# Patient Record
Sex: FEMALE | Race: WHITE | NOT HISPANIC OR LATINO | Employment: OTHER | URBAN - METROPOLITAN AREA
[De-identification: names, ages, dates, MRNs, and addresses within clinical notes are randomized per-mention and may not be internally consistent; named-entity substitution may affect disease eponyms.]

---

## 2017-03-30 ENCOUNTER — HISTORICAL (OUTPATIENT)
Dept: LAB | Facility: HOSPITAL | Age: 62
End: 2017-03-30

## 2017-04-06 ENCOUNTER — HISTORICAL (OUTPATIENT)
Dept: ADMINISTRATIVE | Facility: HOSPITAL | Age: 62
End: 2017-04-06

## 2017-05-04 ENCOUNTER — HISTORICAL (OUTPATIENT)
Dept: ADMINISTRATIVE | Facility: HOSPITAL | Age: 62
End: 2017-05-04

## 2017-05-04 LAB
DEPRECATED CALCIDIOL+CALCIFEROL SERPL-MC: 30.1 NG/ML (ref 30–80)
TSH SERPL-ACNC: 1.57 MIU/ML (ref 0.36–3.74)

## 2017-06-09 ENCOUNTER — TELEPHONE (OUTPATIENT)
Dept: TRANSPLANT | Facility: CLINIC | Age: 62
End: 2017-06-09

## 2017-07-10 ENCOUNTER — TELEPHONE (OUTPATIENT)
Dept: TRANSPLANT | Facility: CLINIC | Age: 62
End: 2017-07-10

## 2017-07-10 NOTE — TELEPHONE ENCOUNTER
----- Message from Estela Matute sent at 7/10/2017  1:53 PM CDT -----  Regarding: Make note  Sp to pt to confirm that her insurance will cover her appt her and they will but only cover one(1) appt until 8/1/2017 and only at [facility]. I told the pt that the [facility] clinic has a waitlist of at least (3) three months and the [facility] clinic is already fully booked for this month. Told the pt I will gave her info to nurse for her to talk w/Dr. Clement. Will call the ref Md office and let them know.

## 2017-07-13 NOTE — TELEPHONE ENCOUNTER
MA called patient to schedule her INitial visit to see Dr. Odom. Patient stated that she need to be seen by 8/1 due to insurance only approving her to come before 8/1.     Dr. Odom is in [place] today 7/13 and the next available she have is not till 8/24 her 8/10 is already booked.     Advice patient to talk to her insurance again and see if they can approve her for the month of August. Patient stated that that is very hard to do.     She will just talk to Dr. Torrez and see what doctor Shan wants to do.

## 2017-07-20 ENCOUNTER — TELEPHONE (OUTPATIENT)
Dept: HEPATOLOGY | Facility: CLINIC | Age: 62
End: 2017-07-20

## 2017-07-20 NOTE — TELEPHONE ENCOUNTER
MA called patient to schedule her Initial visit, she accepted 8/24/17 mailed appt reminder to patient. PEREZ

## 2017-08-24 ENCOUNTER — INITIAL CONSULT (OUTPATIENT)
Dept: HEPATOLOGY | Facility: CLINIC | Age: 62
End: 2017-08-24
Payer: COMMERCIAL

## 2017-08-24 VITALS
SYSTOLIC BLOOD PRESSURE: 163 MMHG | BODY MASS INDEX: 26.08 KG/M2 | HEART RATE: 83 BPM | HEIGHT: 65 IN | DIASTOLIC BLOOD PRESSURE: 97 MMHG | WEIGHT: 156.5 LBS

## 2017-08-24 DIAGNOSIS — R74.8 ELEVATED ALKALINE PHOSPHATASE LEVEL: ICD-10-CM

## 2017-08-24 PROCEDURE — 99245 OFF/OP CONSLTJ NEW/EST HI 55: CPT | Mod: S$GLB,,, | Performed by: INTERNAL MEDICINE

## 2017-08-24 RX ORDER — PANTOPRAZOLE SODIUM 40 MG/1
40 TABLET, DELAYED RELEASE ORAL DAILY
COMMUNITY

## 2017-08-24 RX ORDER — AMOXICILLIN 500 MG
2 CAPSULE ORAL DAILY
COMMUNITY

## 2017-08-24 NOTE — PROGRESS NOTES
[facility] Consultation Note    Reason for Consult:  The encounter diagnosis was Elevated alkaline phosphatase level.    PCP: Primary Doctor No   [address] / [place] [address]    HPI:  This is a 62 y.o. female here for evaluation of: elevated alk phos    No LFTs were received from Dr. Torrez's office.  Only value available for LFTs is alk phos fractionation, which gives a total alk phos of 257 and liver 76%, bone 24%.      Patient reports her alk phos was 180 in January 2016, at which time she was started on pravastatin.  In July alk  Phos increased to 251, she stopped paravstatin.   Alk phos was 312 in April od 2017, and was 257 on may 1, 2017.  She denies any symptoms, including no itching, jaundice, abd pain, wt loss, or wt gain.  No fever, chills, constip, diarrhea.     Labs from Dr Torrez's office:  AMA 56.2, ASMA 29, LKMB normal, LAMIN normal. Ferritin 197.8, hepatitis acute panel neg, cerulo 37.7.      EGD was suggestive of Barretts, and positive for H pylori  EUS was normal.    U/s per patient at Dr. Ibanez was apparently normal.      Liver biopsy 5/25/17: chronic hepatitis, grade 1, etiology undetermined, with minimal portal fibrosis, stage 1.  Comment: histologic features of PBC are not see, few eosinophils in the inflam infiltrate suggesting drug-induced hepatitis.  Only 2 portal tracts were seen.          Elevated liver enzymes: Yes  Abnormal imaging: No  Cirrhosis: No  Hepatitis C: No  Hepatitis B: No  Fatty liver: No  Encephalopathy: No  Post-hospital discharge: No  Symptoms: none    Primary hepatic manifestations:  Fatigue:No  Edema:No  Ascites:No  Encephalopathy:No  Abdominal pain:No  GI bleeds: No  Pruritus:No  Weight Changes:No  Changes in Bowel habits: No  Muscle cramps:No    Risk factors for liver disease:  No jaundice  No transfusions  No IVDU  Did not snort cocaine or similar agents  Did not live with anyone with hepatitis B or C  Sexual partner not tested  No  "hepatotoxic medications  No exposure to industrial toxins  Alcohol:       ROS:  Constitutional: No fevers, chills, weight changes, fatigue  ENT: No allergies, nosebleeds,   CV: No chest pain  Pulm: No cough, shortness of breath  Ophtho: No vision changes  GI/Liver: see HPI  Derm: No rash, itching  Heme: No swollen glands, bruising  MSK: No joint pains, joint swelling  : No dysuria, hematuria, decrease in urine output  Endo: No hot or cold intolerance  Neuro: No confusion, disorientation, difficulty with sleep, memory, concentration, syncope, seizure  Psych: No anxiety, depression    Medical History:  has a past medical history of H pylori ulcer (05/23/2017) and Hyperlipidemia.    Surgical History:  has a past surgical history that includes Esophagogastroduodenoscopy (05/23/2017); Upper endoscopic ultrasound w/ FNA (05/23/2017); Upper gastrointestinal endoscopy; and Liver biopsy (05/25/2017).    Family History: family history includes Breast cancer in her mother; Colon cancer in her maternal aunt; Heart disease in her father and mother; Hypertension in her father and mother..     Social History:  reports that she has quit smoking. She has never used smokeless tobacco. She reports that she does not drink alcohol.    Review of patient's allergies indicates:  No Known Allergies    Current Outpatient Prescriptions   Medication Sig    fish oil-omega-3 fatty acids 300-1,000 mg capsule Take 2 g by mouth once daily.    pantoprazole (PROTONIX) 40 MG tablet Take 40 mg by mouth once daily.     No current facility-administered medications for this visit.        Objective Findings:    Vital Signs:  BP (!) 163/97   Pulse 83   Ht 5' 5" (1.651 m)   Wt 71 kg (156 lb 8 oz)   BMI 26.04 kg/m²   Body mass index is 26.04 kg/m².    Physical Exam:  General Appearance: Well appearing in no acute distress  Head:   Normocephalic, without obvious abnormality  Eyes:    No scleral icterus, EOMI  ENT: Neck supple, Lips, mucosa, and tongue " normal; teeth and gums normal  Lungs: CTA bilaterally in anterior and posterior fields, no wheezes, no crackles.  Heart:  Regular rate and rhythm, S1, S2 normal, no murmurs heard  Abdomen: Soft, non tender, non distended with positive bowel sounds in all four quadrants. No hepatosplenomegaly, ascites, or mass  Extremities: 2+ pulses, no clubbing, cyanosis or edema  Skin: No rash  Neurologic: CN II-XII intact, alert, oriented x 3. No asterixis      Labs:  No results found for: WBC, HGB, HCT, PLT, CHOL, TRIG, HDL, LDLDIRECT, INR, CREATININE, BUN, BILITOT, ALT, AST, ALKPHOS, NA, K, CL, CO2, TSH, PSA, GLUF, HGBA1C, MICROALBUR, AFP    Imaging:   U/s normal per pt.    Endoscopy:    As above.    Assessment:  1. Elevated alkaline phosphatase level    Could be due to drug, or PBC, however no PBC lesions I the liver bx.  Biopsy was inadequate to make a firm diagnosis, due to only two portal tracts seen.  Stage 1 fibrosis and mild inflammmation indicate disease may be too early for any specific dx, especially of pBC. 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  Only takes fish oil and protonix, of which, fish oil can increase enzymes (no transaminases available).  Therefore, suggest measure LFTs now, then stop fish oil, then re-measure lFTs in 2 months.  If alk phos remains elevated, may use ursodiol 250 mg TID and monitor LFTs every 3 months to see if improvement occurs.  May attempt stopping saundra after 6 months to see if alk phos remains normal.  If abnormal, would repeat liver biopsy to obtain more tissue.       -  Patient will not return to me because of insurance issues.  She will return to Dr. Torrez.       No Follow-up on file.      Order summary:  No orders of the defined types were placed in this encounter.      Thank you so much for allowing me to participate in the care of Mary Beth Odom MD

## 2017-08-24 NOTE — LETTER
August 24, 2017      Davie Torrez MD  [address]  [address]  [place] [address]           [facility]  [address]  [place] [address]  Phone: [phone]          Patient: Mary Beth Henson   MR Number: [id number]   YOB: 1955   Date of Visit: 8/24/2017       Dear Dr. Davie Torrez:    Thank you for referring Mary Beth Henson to me for evaluation. Attached you will find relevant portions of my assessment and plan of care.    If you have questions, please do not hesitate to call me. I look forward to following Mary Beth Henson along with you.    Sincerely,    Monica Odom MD    Enclosure  CC:  No Recipients    If you would like to receive this communication electronically, please contact [email or web address] or [phone] to request more information on [email or web address] Link access.    For providers and/or their staff who would like to refer a patient to [facility], please contact us through our one-stop-shop provider referral line, [facility], at [phone].    If you feel you have received this communication in error or would no longer like to receive these types of communications, please e-mail [email or web address]

## 2018-04-12 ENCOUNTER — HISTORICAL (OUTPATIENT)
Dept: ADMINISTRATIVE | Facility: HOSPITAL | Age: 63
End: 2018-04-12

## 2018-06-07 ENCOUNTER — HISTORICAL (OUTPATIENT)
Dept: ADMINISTRATIVE | Facility: HOSPITAL | Age: 63
End: 2018-06-07

## 2018-11-01 ENCOUNTER — HISTORICAL (OUTPATIENT)
Dept: ADMINISTRATIVE | Facility: HOSPITAL | Age: 63
End: 2018-11-01

## 2019-05-16 ENCOUNTER — HISTORICAL (OUTPATIENT)
Dept: ADMINISTRATIVE | Facility: HOSPITAL | Age: 64
End: 2019-05-16

## 2019-05-16 LAB
ALBUMIN SERPL-MCNC: 4.2 GM/DL (ref 3.4–5)
ALBUMIN/GLOB SERPL: 1 RATIO (ref 1.1–2)
ALP SERPL-CCNC: 213 UNIT/L (ref 46–116)
ALT SERPL-CCNC: 37 UNIT/L (ref 12–78)
AST SERPL-CCNC: 21 UNIT/L (ref 10–37)
BILIRUB SERPL-MCNC: 0.5 MG/DL (ref 0.2–1)
BILIRUBIN DIRECT+TOT PNL SERPL-MCNC: 0.14 MG/DL (ref 0–0.2)
BILIRUBIN DIRECT+TOT PNL SERPL-MCNC: 0.36 MG/DL
BUN SERPL-MCNC: 21 MG/DL (ref 7–18)
CALCIUM SERPL-MCNC: 9.7 MG/DL (ref 8.5–10.1)
CHLORIDE SERPL-SCNC: 105 MMOL/L (ref 98–107)
CHOLEST SERPL-MCNC: 319 MG/DL (ref 50–200)
CHOLEST/HDLC SERPL: 5 {RATIO} (ref 0–5)
CO2 SERPL-SCNC: 30 MMOL/L (ref 21–32)
CREAT SERPL-MCNC: 0.93 MG/DL (ref 0.55–1.02)
GLOBULIN SER-MCNC: 4.3 GM/DL (ref 2.4–3.5)
GLUCOSE SERPL-MCNC: 96 MG/DL (ref 74–106)
HDLC SERPL-MCNC: 66 MG/DL (ref 35–60)
LDLC SERPL CALC-MCNC: 230 MG/DL (ref 50–140)
POTASSIUM SERPL-SCNC: 5.1 MMOL/L (ref 3.5–5.1)
PROT SERPL-MCNC: 8.5 GM/DL (ref 6.4–8.2)
SODIUM SERPL-SCNC: 141 MMOL/L (ref 136–145)
TRIGL SERPL-MCNC: 117 MG/DL (ref 30–150)
TSH SERPL-ACNC: 1.48 MIU/ML (ref 0.35–3.75)
VLDLC SERPL CALC-MCNC: 23 MG/DL

## 2019-05-30 ENCOUNTER — HISTORICAL (OUTPATIENT)
Dept: ADMINISTRATIVE | Facility: HOSPITAL | Age: 64
End: 2019-05-30

## 2019-06-27 ENCOUNTER — HISTORICAL (OUTPATIENT)
Dept: ADMINISTRATIVE | Facility: HOSPITAL | Age: 64
End: 2019-06-27

## 2019-06-27 LAB
ABS NEUT (OLG): 2.26
ALBUMIN SERPL-MCNC: 3.9 GM/DL (ref 3.4–5)
ALP SERPL-CCNC: 214 UNIT/L (ref 46–116)
ALT SERPL-CCNC: 39 UNIT/L (ref 12–78)
AST SERPL-CCNC: 24 UNIT/L (ref 10–37)
BASOPHILS # BLD AUTO: 0.02 X10(3)/MCL
BASOPHILS NFR BLD AUTO: 0.4 %
BILIRUB SERPL-MCNC: 0.5 MG/DL (ref 0.2–1)
BILIRUBIN DIRECT+TOT PNL SERPL-MCNC: 0.13 MG/DL (ref 0–0.2)
BILIRUBIN DIRECT+TOT PNL SERPL-MCNC: 0.37 MG/DL
CHOLEST SERPL-MCNC: 249 MG/DL (ref 50–200)
CHOLEST/HDLC SERPL: 4 {RATIO} (ref 0–5)
EOSINOPHIL # BLD AUTO: 0.06 X10(3)/MCL
EOSINOPHIL NFR BLD AUTO: 1.2 %
ERYTHROCYTE [DISTWIDTH] IN BLOOD BY AUTOMATED COUNT: 13 %
ERYTHROCYTE [SEDIMENTATION RATE] IN BLOOD: 24 MM/HR (ref 0–20)
HCT VFR BLD AUTO: 40.6 % (ref 34–46)
HDLC SERPL-MCNC: 65 MG/DL (ref 35–60)
HGB BLD-MCNC: 13.1 GM/DL (ref 11.3–15.4)
IMM GRANULOCYTES # BLD AUTO: 0.03 10*3/UL (ref 0–0.1)
IMM GRANULOCYTES NFR BLD AUTO: 0.6 % (ref 0–1)
LDLC SERPL CALC-MCNC: 164 MG/DL (ref 50–140)
LYMPHOCYTES # BLD AUTO: 2.17 X10(3)/MCL
LYMPHOCYTES NFR BLD AUTO: 43.2 %
MCH RBC QN AUTO: 28.9 PG (ref 27–33)
MCHC RBC AUTO-ENTMCNC: 32.3 GM/DL (ref 32–35)
MCV RBC AUTO: 89.6 FL (ref 81–97)
MONOCYTES # BLD AUTO: 0.48 X10(3)/MCL
MONOCYTES NFR BLD AUTO: 9.6 %
NEUTROPHILS # BLD AUTO: 2.26 X10(3)/MCL
NEUTROPHILS NFR BLD AUTO: 45 %
PLATELET # BLD AUTO: 235 X10(3)/MCL (ref 151–368)
PMV BLD AUTO: 11 FL
PROT SERPL-MCNC: 7.7 GM/DL (ref 6.4–8.2)
RBC # BLD AUTO: 4.53 X10(6)/MCL (ref 3.9–5)
TRIGL SERPL-MCNC: 102 MG/DL (ref 30–150)
VLDLC SERPL CALC-MCNC: 20 MG/DL
WBC # SPEC AUTO: 5.02 X10(3)/MCL (ref 3.4–9.2)

## 2019-12-05 ENCOUNTER — HISTORICAL (OUTPATIENT)
Dept: ADMINISTRATIVE | Facility: HOSPITAL | Age: 64
End: 2019-12-05

## 2019-12-05 LAB
ABS NEUT (OLG): 2.53
ALBUMIN SERPL-MCNC: 4.3 GM/DL (ref 3.2–4.6)
ALBUMIN/GLOB SERPL: 1.1 RATIO (ref 1.1–2)
ALP SERPL-CCNC: 174 UNIT/L (ref 40–150)
ALT SERPL-CCNC: 27 UNIT/L (ref 0–55)
AST SERPL-CCNC: 24 UNIT/L (ref 5–34)
BASOPHILS # BLD AUTO: 0.01 X10(3)/MCL
BASOPHILS NFR BLD AUTO: 0.2 %
BILIRUB SERPL-MCNC: 0.7 MG/DL
BILIRUBIN DIRECT+TOT PNL SERPL-MCNC: 0.3 MG/DL (ref 0–0.5)
BILIRUBIN DIRECT+TOT PNL SERPL-MCNC: 0.4 MG/DL
BUN SERPL-MCNC: 25 MG/DL (ref 9.8–20.1)
CALCIUM SERPL-MCNC: 10.5 MG/DL (ref 8.4–10.2)
CHLORIDE SERPL-SCNC: 104 MMOL/L (ref 98–107)
CHOLEST SERPL-MCNC: 271 MG/DL
CHOLEST/HDLC SERPL: 5 {RATIO} (ref 0–5)
CO2 SERPL-SCNC: 26 MEQ/L (ref 23–31)
CREAT SERPL-MCNC: 1.03 MG/DL (ref 0.55–1.02)
CRP SERPL-MCNC: 0.6 MG/DL
DEPRECATED CALCIDIOL+CALCIFEROL SERPL-MC: 33.26 NG/ML (ref 30–80)
EOSINOPHIL # BLD AUTO: 0.07 X10(3)/MCL
EOSINOPHIL NFR BLD AUTO: 1.4 %
ERYTHROCYTE [DISTWIDTH] IN BLOOD BY AUTOMATED COUNT: 14 %
ERYTHROCYTE [SEDIMENTATION RATE] IN BLOOD: 24 MM/HR (ref 0–20)
GLOBULIN SER-MCNC: 3.8 GM/DL (ref 2.4–3.5)
GLUCOSE SERPL-MCNC: 102 MG/DL (ref 82–115)
HCT VFR BLD AUTO: 41.6 % (ref 34–46)
HDLC SERPL-MCNC: 54 MG/DL
HGB BLD-MCNC: 13.6 GM/DL (ref 11.3–15.4)
IMM GRANULOCYTES # BLD AUTO: 0 10*3/UL (ref 0–0.1)
IMM GRANULOCYTES NFR BLD AUTO: 0 % (ref 0–1)
LDLC SERPL CALC-MCNC: 193 MG/DL (ref 50–140)
LYMPHOCYTES # BLD AUTO: 2.01 X10(3)/MCL
LYMPHOCYTES NFR BLD AUTO: 39.8 %
MCH RBC QN AUTO: 28.7 PG (ref 27–33)
MCHC RBC AUTO-ENTMCNC: 32.7 GM/DL (ref 32–35)
MCV RBC AUTO: 87.8 FL (ref 81–97)
MONOCYTES # BLD AUTO: 0.43 X10(3)/MCL
MONOCYTES NFR BLD AUTO: 8.5 %
NEUTROPHILS # BLD AUTO: 2.53 X10(3)/MCL
NEUTROPHILS NFR BLD AUTO: 50.1 %
PLATELET # BLD AUTO: 255 X10(3)/MCL (ref 140–450)
PMV BLD AUTO: 11 FL
POTASSIUM SERPL-SCNC: 5.1 MMOL/L (ref 3.5–5.1)
PROT SERPL-MCNC: 8.1 GM/DL (ref 5.8–7.6)
RBC # BLD AUTO: 4.74 X10(6)/MCL (ref 3.9–5)
SODIUM SERPL-SCNC: 140 MMOL/L (ref 136–145)
TRIGL SERPL-MCNC: 122 MG/DL (ref 37–140)
VLDLC SERPL CALC-MCNC: 24 MG/DL
WBC # SPEC AUTO: 5.05 X10(3)/MCL (ref 3.4–9.2)

## 2020-06-18 ENCOUNTER — HISTORICAL (OUTPATIENT)
Dept: ADMINISTRATIVE | Facility: HOSPITAL | Age: 65
End: 2020-06-18

## 2020-09-10 ENCOUNTER — HISTORICAL (OUTPATIENT)
Dept: ADMINISTRATIVE | Facility: HOSPITAL | Age: 65
End: 2020-09-10

## 2020-09-10 LAB
ALBUMIN SERPL-MCNC: 3.8 GM/DL (ref 3.4–4.8)
ALP SERPL-CCNC: 67 UNIT/L (ref 40–150)
ALT SERPL-CCNC: 36 UNIT/L (ref 0–55)
AST SERPL-CCNC: 33 UNIT/L (ref 5–34)
BILIRUB SERPL-MCNC: 0.4 MG/DL
BILIRUBIN DIRECT+TOT PNL SERPL-MCNC: 0.2 MG/DL
BILIRUBIN DIRECT+TOT PNL SERPL-MCNC: 0.2 MG/DL (ref 0–0.5)
CHOLEST SERPL-MCNC: 187 MG/DL
CHOLEST/HDLC SERPL: 3 {RATIO} (ref 0–5)
ERYTHROCYTE [SEDIMENTATION RATE] IN BLOOD: 23 MM/HR (ref 0–20)
HDLC SERPL-MCNC: 54 MG/DL (ref 35–60)
LDLC SERPL CALC-MCNC: 115 MG/DL (ref 50–140)
PROT SERPL-MCNC: 7 GM/DL (ref 5.8–7.6)
TRIGL SERPL-MCNC: 91 MG/DL (ref 37–140)
VLDLC SERPL CALC-MCNC: 18 MG/DL

## 2021-07-08 ENCOUNTER — HISTORICAL (OUTPATIENT)
Dept: ADMINISTRATIVE | Facility: HOSPITAL | Age: 66
End: 2021-07-08

## 2021-07-08 LAB
ABS NEUT (OLG): 1.94
ALBUMIN SERPL-MCNC: 4.3 GM/DL (ref 3.4–4.8)
ALBUMIN/GLOB SERPL: 1.2 RATIO (ref 1.1–2)
ALP SERPL-CCNC: 52 UNIT/L (ref 40–150)
ALT SERPL-CCNC: 29 UNIT/L (ref 0–55)
AST SERPL-CCNC: 31 UNIT/L (ref 5–34)
BASOPHILS # BLD AUTO: 0.01 X10(3)/MCL
BASOPHILS NFR BLD AUTO: 0.3 %
BILIRUB SERPL-MCNC: 0.5 MG/DL
BILIRUBIN DIRECT+TOT PNL SERPL-MCNC: 0.2 MG/DL (ref 0–0.5)
BILIRUBIN DIRECT+TOT PNL SERPL-MCNC: 0.3 MG/DL
BUN SERPL-MCNC: 25 MG/DL (ref 9.8–20.1)
CALCIUM SERPL-MCNC: 10.5 MG/DL (ref 8.4–10.2)
CHLORIDE SERPL-SCNC: 109 MMOL/L (ref 98–107)
CHOLEST SERPL-MCNC: 201 MG/DL
CHOLEST/HDLC SERPL: 4 {RATIO} (ref 0–5)
CO2 SERPL-SCNC: 29 MEQ/L (ref 23–31)
CREAT SERPL-MCNC: 1.01 MG/DL (ref 0.55–1.02)
CRP SERPL-MCNC: 0.21 MG/DL
DEPRECATED CALCIDIOL+CALCIFEROL SERPL-MC: 39.2 NG/ML (ref 30–80)
EOSINOPHIL # BLD AUTO: 0.08 X10(3)/MCL
EOSINOPHIL NFR BLD AUTO: 2.1 %
ERYTHROCYTE [DISTWIDTH] IN BLOOD BY AUTOMATED COUNT: 14 %
ERYTHROCYTE [SEDIMENTATION RATE] IN BLOOD: 17 MM/HR (ref 0–20)
GLOBULIN SER-MCNC: 3.5 GM/DL (ref 2.4–3.5)
GLUCOSE SERPL-MCNC: 88 MG/DL (ref 82–115)
HCT VFR BLD AUTO: 39.3 % (ref 34–46)
HDLC SERPL-MCNC: 54 MG/DL (ref 35–60)
HGB BLD-MCNC: 12.5 GM/DL (ref 11.3–15.4)
IMM GRANULOCYTES # BLD AUTO: 0.01 10*3/UL (ref 0–0.1)
IMM GRANULOCYTES NFR BLD AUTO: 0.3 % (ref 0–1)
LDLC SERPL CALC-MCNC: 129 MG/DL (ref 50–140)
LYMPHOCYTES # BLD AUTO: 1.49 X10(3)/MCL
LYMPHOCYTES NFR BLD AUTO: 38.7 %
MCH RBC QN AUTO: 28.5 PG (ref 27–33)
MCHC RBC AUTO-ENTMCNC: 31.8 GM/DL (ref 32–35)
MCV RBC AUTO: 89.5 FL (ref 81–97)
MONOCYTES # BLD AUTO: 0.32 X10(3)/MCL
MONOCYTES NFR BLD AUTO: 8.3 %
NEUTROPHILS # BLD AUTO: 1.94 X10(3)/MCL
NEUTROPHILS NFR BLD AUTO: 50.3 %
PLATELET # BLD AUTO: 277 X10(3)/MCL (ref 140–450)
PMV BLD AUTO: 11 FL
POTASSIUM SERPL-SCNC: 5 MMOL/L (ref 3.5–5.1)
PROT SERPL-MCNC: 7.8 GM/DL (ref 5.8–7.6)
RBC # BLD AUTO: 4.39 X10(6)/MCL (ref 3.9–5)
SODIUM SERPL-SCNC: 143 MMOL/L (ref 136–145)
TRIGL SERPL-MCNC: 88 MG/DL (ref 37–140)
TSH SERPL-ACNC: 1.94 UIU/ML (ref 0.35–4.94)
VLDLC SERPL CALC-MCNC: 18 MG/DL
WBC # SPEC AUTO: 3.85 X10(3)/MCL (ref 3.4–9.2)

## 2021-07-19 LAB — CRC RECOMMENDATION EXT: NORMAL

## 2022-01-14 ENCOUNTER — HISTORICAL (OUTPATIENT)
Dept: LAB | Facility: HOSPITAL | Age: 67
End: 2022-01-14

## 2022-01-14 LAB
ABS NEUT (OLG): 2.47
ALBUMIN SERPL-MCNC: 4.2 GM/DL (ref 3.4–4.8)
ALBUMIN/GLOB SERPL: 1.3 RATIO (ref 1.1–2)
ALP SERPL-CCNC: 49 UNIT/L (ref 40–150)
ALT SERPL-CCNC: 31 UNIT/L (ref 0–55)
AST SERPL-CCNC: 31 UNIT/L (ref 5–34)
BASOPHILS # BLD AUTO: 0.02 X10(3)/MCL
BASOPHILS NFR BLD AUTO: 0.5 %
BILIRUB SERPL-MCNC: 0.4 MG/DL
BILIRUBIN DIRECT+TOT PNL SERPL-MCNC: 0.2 MG/DL
BILIRUBIN DIRECT+TOT PNL SERPL-MCNC: 0.2 MG/DL (ref 0–0.5)
BUN SERPL-MCNC: 26 MG/DL (ref 9.8–20.1)
CALCIUM SERPL-MCNC: 10.2 MG/DL (ref 8.7–10.5)
CHLORIDE SERPL-SCNC: 109 MMOL/L (ref 98–107)
CHOLEST SERPL-MCNC: 189 MG/DL
CHOLEST/HDLC SERPL: 3 {RATIO} (ref 0–5)
CO2 SERPL-SCNC: 27 MEQ/L (ref 23–31)
CREAT SERPL-MCNC: 0.96 MG/DL (ref 0.55–1.02)
CRP SERPL-MCNC: 0.31 MG/DL
DEPRECATED CALCIDIOL+CALCIFEROL SERPL-MC: 32.3 NG/ML (ref 30–80)
EOSINOPHIL # BLD AUTO: 0.05 X10(3)/MCL
EOSINOPHIL NFR BLD AUTO: 1.1 %
ERYTHROCYTE [DISTWIDTH] IN BLOOD BY AUTOMATED COUNT: 13 %
ERYTHROCYTE [SEDIMENTATION RATE] IN BLOOD: 16 MM/HR (ref 0–20)
GLOBULIN SER-MCNC: 3.2 GM/DL (ref 2.4–3.5)
GLUCOSE SERPL-MCNC: 97 MG/DL (ref 82–115)
HCT VFR BLD AUTO: 37.2 % (ref 34–46)
HDLC SERPL-MCNC: 63 MG/DL (ref 35–60)
HGB BLD-MCNC: 12.1 GM/DL (ref 11.3–15.4)
IMM GRANULOCYTES # BLD AUTO: 0.01 10*3/UL (ref 0–0.1)
IMM GRANULOCYTES NFR BLD AUTO: 0.2 % (ref 0–1)
LDLC SERPL CALC-MCNC: 112 MG/DL (ref 50–140)
LYMPHOCYTES # BLD AUTO: 1.55 X10(3)/MCL
LYMPHOCYTES NFR BLD AUTO: 34.9 %
MAGNESIUM SERPL-MCNC: 2 MG/DL (ref 1.6–2.6)
MCH RBC QN AUTO: 29.2 PG (ref 27–33)
MCHC RBC AUTO-ENTMCNC: 32.5 GM/DL (ref 32–35)
MCV RBC AUTO: 89.6 FL (ref 81–97)
MONOCYTES # BLD AUTO: 0.34 X10(3)/MCL
MONOCYTES NFR BLD AUTO: 7.7 %
NEUTROPHILS # BLD AUTO: 2.47 X10(3)/MCL
NEUTROPHILS NFR BLD AUTO: 55.6 %
PLATELET # BLD AUTO: 287 X10(3)/MCL (ref 140–450)
PMV BLD AUTO: 12 FL
POTASSIUM SERPL-SCNC: 5 MMOL/L (ref 3.5–5.1)
PROT SERPL-MCNC: 7.4 GM/DL (ref 5.8–7.6)
RBC # BLD AUTO: 4.15 X10(6)/MCL (ref 3.9–5)
SODIUM SERPL-SCNC: 144 MMOL/L (ref 136–145)
TRIGL SERPL-MCNC: 70 MG/DL (ref 37–140)
VIT B12 SERPL-MCNC: 341 PG/ML (ref 213–816)
VLDLC SERPL CALC-MCNC: 14 MG/DL
WBC # SPEC AUTO: 4.44 X10(3)/MCL (ref 3.4–9.2)

## 2022-01-27 ENCOUNTER — HISTORICAL (OUTPATIENT)
Dept: RADIOLOGY | Facility: HOSPITAL | Age: 67
End: 2022-01-27

## 2022-06-01 ENCOUNTER — LAB VISIT (OUTPATIENT)
Dept: LAB | Facility: HOSPITAL | Age: 67
End: 2022-06-01
Attending: FAMILY MEDICINE
Payer: MEDICARE

## 2022-06-01 DIAGNOSIS — I10 HYPERTENSION, UNSPECIFIED TYPE: Primary | ICD-10-CM

## 2022-06-01 LAB
ALBUMIN SERPL-MCNC: 4.6 GM/DL (ref 3.4–4.8)
ALBUMIN/GLOB SERPL: 1.2 RATIO (ref 1.1–2)
ALP SERPL-CCNC: 59 UNIT/L (ref 40–150)
ALT SERPL-CCNC: 31 UNIT/L (ref 0–55)
AST SERPL-CCNC: 26 UNIT/L (ref 5–34)
BASOPHILS # BLD AUTO: 0.04 X10(3)/MCL (ref 0–0.2)
BASOPHILS NFR BLD AUTO: 0.7 %
BILIRUBIN DIRECT+TOT PNL SERPL-MCNC: 0.5 MG/DL
BUN SERPL-MCNC: 32 MG/DL (ref 9.8–20.1)
CALCIUM SERPL-MCNC: 10.7 MG/DL (ref 8.4–10.2)
CHLORIDE SERPL-SCNC: 106 MMOL/L (ref 98–107)
CHOLEST SERPL-MCNC: 230 MG/DL
CHOLEST/HDLC SERPL: 4 {RATIO} (ref 0–5)
CO2 SERPL-SCNC: 26 MMOL/L (ref 23–31)
CREAT SERPL-MCNC: 1.31 MG/DL (ref 0.55–1.02)
EOSINOPHIL # BLD AUTO: 0.06 X10(3)/MCL (ref 0–0.9)
EOSINOPHIL NFR BLD AUTO: 1 %
ERYTHROCYTE [DISTWIDTH] IN BLOOD BY AUTOMATED COUNT: 13.2 % (ref 11.5–17)
GLOBULIN SER-MCNC: 3.8 GM/DL (ref 2.4–3.5)
GLUCOSE SERPL-MCNC: 103 MG/DL (ref 82–115)
HCT VFR BLD AUTO: 42.8 % (ref 37–47)
HDLC SERPL-MCNC: 61 MG/DL (ref 35–60)
HGB BLD-MCNC: 14 GM/DL (ref 12–16)
IMM GRANULOCYTES # BLD AUTO: 0.02 X10(3)/MCL (ref 0–0.02)
IMM GRANULOCYTES NFR BLD AUTO: 0.3 % (ref 0–0.43)
LDLC SERPL CALC-MCNC: 152 MG/DL (ref 50–140)
LYMPHOCYTES # BLD AUTO: 1.82 X10(3)/MCL (ref 0.6–4.6)
LYMPHOCYTES NFR BLD AUTO: 31 %
MCH RBC QN AUTO: 28.9 PG (ref 27–31)
MCHC RBC AUTO-ENTMCNC: 32.7 MG/DL (ref 33–36)
MCV RBC AUTO: 88.4 FL (ref 80–94)
MONOCYTES # BLD AUTO: 0.53 X10(3)/MCL (ref 0.1–1.3)
MONOCYTES NFR BLD AUTO: 9 %
NEUTROPHILS # BLD AUTO: 3.4 X10(3)/MCL (ref 2.1–9.2)
NEUTROPHILS NFR BLD AUTO: 58 %
NRBC BLD AUTO-RTO: 0 %
PLATELET # BLD AUTO: 322 X10(3)/MCL (ref 130–400)
PMV BLD AUTO: 11.4 FL (ref 9.4–12.4)
POTASSIUM SERPL-SCNC: 5.9 MMOL/L (ref 3.5–5.1)
PROT SERPL-MCNC: 8.4 GM/DL (ref 5.8–7.6)
RBC # BLD AUTO: 4.84 X10(6)/MCL (ref 4.2–5.4)
SODIUM SERPL-SCNC: 140 MMOL/L (ref 136–145)
TRIGL SERPL-MCNC: 84 MG/DL (ref 37–140)
TSH SERPL-ACNC: 1.35 UIU/ML (ref 0.35–4.94)
VLDLC SERPL CALC-MCNC: 17 MG/DL
WBC # SPEC AUTO: 5.9 X10(3)/MCL (ref 4.5–11.5)

## 2022-06-01 PROCEDURE — 84443 ASSAY THYROID STIM HORMONE: CPT

## 2022-06-01 PROCEDURE — 36415 COLL VENOUS BLD VENIPUNCTURE: CPT

## 2022-06-01 PROCEDURE — 85025 COMPLETE CBC W/AUTO DIFF WBC: CPT

## 2022-06-01 PROCEDURE — 80053 COMPREHEN METABOLIC PANEL: CPT

## 2022-06-01 PROCEDURE — 80061 LIPID PANEL: CPT

## 2022-06-06 ENCOUNTER — HOSPITAL ENCOUNTER (OUTPATIENT)
Dept: RADIOLOGY | Facility: HOSPITAL | Age: 67
Discharge: HOME | End: 2022-06-06
Attending: FAMILY MEDICINE
Payer: MEDICARE

## 2022-06-06 DIAGNOSIS — Z12.31 BREAST CANCER SCREENING BY MAMMOGRAM: ICD-10-CM

## 2022-06-06 PROCEDURE — 77067 MAMMO DIGITAL SCREENING BILAT WITH TOMO: ICD-10-PCS | Mod: 26,59,, | Performed by: RADIOLOGY

## 2022-06-06 PROCEDURE — 77067 SCR MAMMO BI INCL CAD: CPT | Mod: 26,59,, | Performed by: RADIOLOGY

## 2022-06-06 PROCEDURE — 77067 SCR MAMMO BI INCL CAD: CPT | Mod: TC

## 2022-06-06 PROCEDURE — 77063 BREAST TOMOSYNTHESIS BI: CPT | Mod: 26,59,, | Performed by: RADIOLOGY

## 2022-06-06 PROCEDURE — 77063 MAMMO DIGITAL SCREENING BILAT WITH TOMO: ICD-10-PCS | Mod: 26,59,, | Performed by: RADIOLOGY

## 2022-06-15 ENCOUNTER — LAB VISIT (OUTPATIENT)
Dept: LAB | Facility: HOSPITAL | Age: 67
End: 2022-06-15
Attending: FAMILY MEDICINE
Payer: MEDICARE

## 2022-06-15 DIAGNOSIS — E87.5 HYPERKALEMIA: Primary | ICD-10-CM

## 2022-06-15 LAB — POTASSIUM SERPL-SCNC: 5.2 MMOL/L (ref 3.5–5.1)

## 2022-06-15 PROCEDURE — 36415 COLL VENOUS BLD VENIPUNCTURE: CPT

## 2022-06-15 PROCEDURE — 84132 ASSAY OF SERUM POTASSIUM: CPT

## 2022-09-28 ENCOUNTER — HOSPITAL ENCOUNTER (OUTPATIENT)
Dept: RADIOLOGY | Facility: HOSPITAL | Age: 67
Discharge: HOME | End: 2022-09-28
Attending: NURSE PRACTITIONER
Payer: MEDICARE

## 2022-09-28 DIAGNOSIS — K21.9 GASTROESOPHAGEAL REFLUX DISEASE: ICD-10-CM

## 2022-09-28 DIAGNOSIS — K74.2 HEPATIC FIBROSIS WITH HEPATIC SCLEROSIS: ICD-10-CM

## 2022-09-28 DIAGNOSIS — K22.70 BARRETT'S ESOPHAGUS WITHOUT DYSPLASIA: ICD-10-CM

## 2022-09-28 DIAGNOSIS — K74.3 PRIMARY BILIARY CHOLANGITIS: ICD-10-CM

## 2022-09-28 DIAGNOSIS — K57.30 DIVERTICULOSIS OF LARGE INTESTINE WITHOUT PERFORATION OR ABSCESS WITHOUT BLEEDING: ICD-10-CM

## 2022-09-28 PROCEDURE — 76700 US EXAM ABDOM COMPLETE: CPT | Mod: TC

## 2022-11-17 ENCOUNTER — OFFICE VISIT (OUTPATIENT)
Dept: FAMILY MEDICINE | Facility: CLINIC | Age: 67
End: 2022-11-17
Payer: MEDICARE

## 2022-11-17 VITALS
TEMPERATURE: 99 F | RESPIRATION RATE: 20 BRPM | DIASTOLIC BLOOD PRESSURE: 86 MMHG | OXYGEN SATURATION: 99 % | HEART RATE: 74 BPM | SYSTOLIC BLOOD PRESSURE: 132 MMHG | BODY MASS INDEX: 26.22 KG/M2 | HEIGHT: 65 IN | WEIGHT: 157.38 LBS

## 2022-11-17 DIAGNOSIS — Z13.820 ENCOUNTER FOR SCREENING FOR OSTEOPOROSIS: ICD-10-CM

## 2022-11-17 DIAGNOSIS — Z00.00 WELLNESS EXAMINATION: ICD-10-CM

## 2022-11-17 DIAGNOSIS — E78.5 HYPERLIPIDEMIA, UNSPECIFIED HYPERLIPIDEMIA TYPE: ICD-10-CM

## 2022-11-17 DIAGNOSIS — Z76.89 ESTABLISHING CARE WITH NEW DOCTOR, ENCOUNTER FOR: Primary | ICD-10-CM

## 2022-11-17 DIAGNOSIS — M65.312 TRIGGER FINGER OF LEFT THUMB: ICD-10-CM

## 2022-11-17 DIAGNOSIS — Z78.0 ASYMPTOMATIC MENOPAUSAL STATE: ICD-10-CM

## 2022-11-17 DIAGNOSIS — R41.3 IMPAIRED MEMORY: ICD-10-CM

## 2022-11-17 PROCEDURE — 99205 PR OFFICE/OUTPT VISIT, NEW, LEVL V, 60-74 MIN: ICD-10-PCS | Mod: ,,, | Performed by: REGISTERED NURSE

## 2022-11-17 PROCEDURE — 99205 OFFICE O/P NEW HI 60 MIN: CPT | Mod: ,,, | Performed by: REGISTERED NURSE

## 2022-11-17 RX ORDER — CITALOPRAM 20 MG/1
20 TABLET, FILM COATED ORAL DAILY
COMMUNITY
Start: 2022-10-01 | End: 2023-04-03

## 2022-11-17 RX ORDER — OBETICHOLIC ACID 5 MG/1
1 TABLET, FILM COATED ORAL DAILY
COMMUNITY
Start: 2022-11-10

## 2022-11-17 RX ORDER — FENOFIBRATE 145 MG/1
145 TABLET, FILM COATED ORAL DAILY
COMMUNITY
Start: 2022-11-10

## 2022-11-17 RX ORDER — URSODIOL 300 MG/1
300 CAPSULE ORAL 2 TIMES DAILY
COMMUNITY
Start: 2022-11-10

## 2022-11-17 NOTE — PROGRESS NOTES
"Subjective:       Patient ID: Mary Beth Henson is a 67 y.o. female.    Chief Complaint: Establish Care    Patient is a 67-year-old female here today to establish care and for wellness exam.  Patient has past medical history of anxiety, hyperlipidemia, GERD, and PBC.  Patient complaining of left thumb pain > 6 months, patient states pain is only at night and "feels like my thumb is stuck", patient also complaining of impaired memory, patient states her father was diagnosed with dementia and she has noticed a slight decline in cognition.    Review of Systems   Constitutional:  Negative for chills, fatigue and fever.   HENT: Negative.     Eyes: Negative.    Respiratory: Negative.     Cardiovascular: Negative.    Gastrointestinal:  Positive for reflux.   Endocrine: Negative.    Genitourinary: Negative.    Musculoskeletal:  Positive for joint deformity.   Integumentary:  Negative.   Allergic/Immunologic: Negative.    Neurological:  Positive for memory loss. Negative for dizziness, speech difficulty, weakness and headaches.   Hematological: Negative.    Psychiatric/Behavioral:  Negative for suicidal ideas. The patient is nervous/anxious.        Objective:      Physical Exam  Constitutional:       Appearance: Normal appearance.   HENT:      Head: Normocephalic.      Right Ear: Tympanic membrane normal.      Left Ear: Tympanic membrane normal.      Nose: Nose normal.      Mouth/Throat:      Mouth: Mucous membranes are moist.   Eyes:      Extraocular Movements: Extraocular movements intact.      Conjunctiva/sclera: Conjunctivae normal.      Pupils: Pupils are equal, round, and reactive to light.   Cardiovascular:      Rate and Rhythm: Normal rate and regular rhythm.      Pulses: Normal pulses.      Heart sounds: Normal heart sounds.   Pulmonary:      Effort: Pulmonary effort is normal.      Breath sounds: Normal breath sounds.   Abdominal:      General: Abdomen is flat.      Palpations: Abdomen is soft.   Musculoskeletal:   "       General: Tenderness present.      Right hand: Bony tenderness present. Decreased strength of thumb/finger opposition. Normal sensation. There is no disruption of two-point discrimination. Normal capillary refill. Normal pulse.      Left hand: Bony tenderness present. Decreased strength of thumb/finger opposition. Normal sensation. There is no disruption of two-point discrimination. Normal capillary refill. Normal pulse.      Cervical back: Normal range of motion and neck supple.   Skin:     General: Skin is warm.      Capillary Refill: Capillary refill takes less than 2 seconds.   Neurological:      Mental Status: She is alert and oriented to person, place, and time.      Sensory: Sensation is intact.      Motor: Motor function is intact. No weakness or tremor.      Coordination: Coordination is intact.      Gait: Gait is intact.      Comments: Pt reports decline in memory, pt AAOx3, able to complete ADLS independently but states she has noticed her short term memory isnt the same.                  Assessment:       Problem List Items Addressed This Visit          Neuro    Impaired memory    Relevant Orders    Ambulatory referral/consult to Neurology       Cardiac/Vascular    Hyperlipidemia       Orthopedic    Trigger finger of left thumb       Other    Wellness examination     Other Visit Diagnoses       Establishing care with new doctor, encounter for    -  Primary    Encounter for screening for osteoporosis        Relevant Orders    DXA Bone Density Spine And Hip    Asymptomatic menopausal state        Relevant Orders    DXA Bone Density Spine And Hip        I spent a total of 30 minutes on the day of the visit.This includes face to face time and non-face to face time preparing to see the patient (eg, review of tests), obtaining and/or reviewing separately obtained history, documenting clinical information in the electronic or other health record, independently interpreting results and communicating results  to the patient/family/caregiver, or care coordinator.     Plan:   Wellness exam-healthy lifestyle discussed with patient, labs reviewed today.  Last mammogram was June of 2022, last colonoscopy was 2022, DEXA scan ordered today. Will call patient with results    Anxiety-well controlled on Celexa, patient denies SI/HI, instructed to report to ED if these present    Hyperlipidemia-low-cholesterol/low-fat diet discussed with patient, cholesterol controlled on current   medication regimen.    Trigger finger-patient instructed to use OTC NSAIDs such as Motrin or Aleve as directed,     Impaired memory-neurology referral sent today, patient instructed to report any decline in mental status immediately or report to the ED, patient verbalized understanding.    Return to clinic in 1 year for wellness/or as needed

## 2023-02-15 ENCOUNTER — TELEPHONE (OUTPATIENT)
Dept: NEUROLOGY | Facility: CLINIC | Age: 68
End: 2023-02-15
Payer: MEDICARE

## 2023-02-15 DIAGNOSIS — R41.3 IMPAIRED MEMORY: Primary | ICD-10-CM

## 2023-02-15 NOTE — TELEPHONE ENCOUNTER
Good morning,    The referral sent to [facility] has been reviewed by Deedee Burns NP. At this time, she believes a referral to Geriatrics would be more appropriate. Please address.    Thank you,    [facility]

## 2023-02-17 LAB — BMD RECOMMENDATION EXT: NORMAL

## 2023-02-20 PROBLEM — Z00.00 WELLNESS EXAMINATION: Status: RESOLVED | Noted: 2022-11-17 | Resolved: 2023-02-20

## 2023-03-01 ENCOUNTER — TELEPHONE (OUTPATIENT)
Dept: FAMILY MEDICINE | Facility: CLINIC | Age: 68
End: 2023-03-01
Payer: MEDICARE

## 2023-03-14 ENCOUNTER — DOCUMENTATION ONLY (OUTPATIENT)
Dept: FAMILY MEDICINE | Facility: CLINIC | Age: 68
End: 2023-03-14
Payer: MEDICARE

## 2023-03-20 ENCOUNTER — TELEPHONE (OUTPATIENT)
Dept: FAMILY MEDICINE | Facility: CLINIC | Age: 68
End: 2023-03-20
Payer: MEDICARE

## 2023-03-20 NOTE — TELEPHONE ENCOUNTER
----- Message from Nimo Veronica sent at 3/20/2023 11:59 AM CDT -----  Regarding: results  She called to see if her bone density test results were in if so can ya'll please call her with those results # [phone]  thanks

## 2023-03-31 DIAGNOSIS — F41.9 ANXIETY DISORDER, UNSPECIFIED: ICD-10-CM

## 2023-04-03 ENCOUNTER — TELEPHONE (OUTPATIENT)
Dept: FAMILY MEDICINE | Facility: CLINIC | Age: 68
End: 2023-04-03
Payer: MEDICARE

## 2023-04-03 RX ORDER — CITALOPRAM 20 MG/1
TABLET, FILM COATED ORAL
Qty: 90 TABLET | Refills: 3 | Status: SHIPPED | OUTPATIENT
Start: 2023-04-03 | End: 2023-11-20

## 2023-04-03 NOTE — TELEPHONE ENCOUNTER
----- Message from Nimo Veronica sent at 4/3/2023  9:28 AM CDT -----  Regarding: refill  citalopram (CELEXA) 20 MG tablet, Pt called saying she needs a refill sent to [facility] in [facility]

## 2023-04-11 ENCOUNTER — LAB VISIT (OUTPATIENT)
Dept: LAB | Facility: HOSPITAL | Age: 68
End: 2023-04-11
Attending: NURSE PRACTITIONER
Payer: MEDICARE

## 2023-04-11 DIAGNOSIS — K74.2 HEPATIC FIBROSIS WITH HEPATIC SCLEROSIS: ICD-10-CM

## 2023-04-11 DIAGNOSIS — E78.5 HYPERLIPIDEMIA, UNSPECIFIED HYPERLIPIDEMIA TYPE: ICD-10-CM

## 2023-04-11 DIAGNOSIS — K21.9 GASTROESOPHAGEAL REFLUX DISEASE, UNSPECIFIED WHETHER ESOPHAGITIS PRESENT: ICD-10-CM

## 2023-04-11 DIAGNOSIS — K57.30 DIVERTICULOSIS OF LARGE INTESTINE WITHOUT DIVERTICULITIS: ICD-10-CM

## 2023-04-11 DIAGNOSIS — K74.3 CHOLANGITIC CIRRHOSIS: ICD-10-CM

## 2023-04-11 DIAGNOSIS — K22.70 BARRETT'S ESOPHAGUS: Primary | ICD-10-CM

## 2023-04-11 LAB
ALBUMIN SERPL-MCNC: 4.3 G/DL (ref 3.4–4.8)
ALBUMIN/GLOB SERPL: 1.2 RATIO (ref 1.1–2)
ALP SERPL-CCNC: 72 UNIT/L (ref 40–150)
ALT SERPL-CCNC: 29 UNIT/L (ref 0–55)
AST SERPL-CCNC: 29 UNIT/L (ref 5–34)
BASOPHILS # BLD AUTO: 0.03 X10(3)/MCL (ref 0–0.2)
BASOPHILS NFR BLD AUTO: 0.7 %
BILIRUBIN DIRECT+TOT PNL SERPL-MCNC: 0.5 MG/DL
BUN SERPL-MCNC: 20 MG/DL (ref 9.8–20.1)
CALCIUM SERPL-MCNC: 10.4 MG/DL (ref 8.4–10.2)
CHLORIDE SERPL-SCNC: 108 MMOL/L (ref 98–107)
CHOLEST SERPL-MCNC: 232 MG/DL
CHOLEST/HDLC SERPL: 4 {RATIO} (ref 0–5)
CO2 SERPL-SCNC: 27 MMOL/L (ref 23–31)
CREAT SERPL-MCNC: 1.11 MG/DL (ref 0.55–1.02)
CRP SERPL-MCNC: 3.3 MG/L
DEPRECATED CALCIDIOL+CALCIFEROL SERPL-MC: 49.7 NG/ML (ref 30–80)
EOSINOPHIL # BLD AUTO: 0.09 X10(3)/MCL (ref 0–0.9)
EOSINOPHIL NFR BLD AUTO: 2.1 %
ERYTHROCYTE [DISTWIDTH] IN BLOOD BY AUTOMATED COUNT: 13.6 % (ref 11.5–17)
ERYTHROCYTE [SEDIMENTATION RATE] IN BLOOD: 14 MM/HR (ref 0–20)
FERRITIN SERPL-MCNC: 205.01 NG/ML (ref 4.63–204)
GFR SERPLBLD CREATININE-BSD FMLA CKD-EPI: 55 MLS/MIN/1.73/M2
GLOBULIN SER-MCNC: 3.7 GM/DL (ref 2.4–3.5)
GLUCOSE SERPL-MCNC: 100 MG/DL (ref 82–115)
HCT VFR BLD AUTO: 41.3 % (ref 37–47)
HDLC SERPL-MCNC: 61 MG/DL (ref 35–60)
HGB BLD-MCNC: 13.3 G/DL (ref 12–16)
IMM GRANULOCYTES # BLD AUTO: 0.01 X10(3)/MCL (ref 0–0.04)
IMM GRANULOCYTES NFR BLD AUTO: 0.2 %
INR BLD: 1 (ref 0–1.3)
LDLC SERPL CALC-MCNC: 150 MG/DL (ref 50–140)
LYMPHOCYTES # BLD AUTO: 1.59 X10(3)/MCL (ref 0.6–4.6)
LYMPHOCYTES NFR BLD AUTO: 37.5 %
MCH RBC QN AUTO: 28.6 PG (ref 27–31)
MCHC RBC AUTO-ENTMCNC: 32.2 G/DL (ref 33–36)
MCV RBC AUTO: 88.8 FL (ref 80–94)
MONOCYTES # BLD AUTO: 0.41 X10(3)/MCL (ref 0.1–1.3)
MONOCYTES NFR BLD AUTO: 9.7 %
NEUTROPHILS # BLD AUTO: 2.11 X10(3)/MCL (ref 2.1–9.2)
NEUTROPHILS NFR BLD AUTO: 49.8 %
NRBC BLD AUTO-RTO: 0 %
PLATELET # BLD AUTO: 290 X10(3)/MCL (ref 130–400)
PMV BLD AUTO: 11.8 FL (ref 7.4–10.4)
POTASSIUM SERPL-SCNC: 5 MMOL/L (ref 3.5–5.1)
PROT SERPL-MCNC: 8 GM/DL (ref 5.8–7.6)
PROTHROMBIN TIME: 9.9 SECONDS (ref 9.1–10.9)
RBC # BLD AUTO: 4.65 X10(6)/MCL (ref 4.2–5.4)
SODIUM SERPL-SCNC: 144 MMOL/L (ref 136–145)
TRIGL SERPL-MCNC: 104 MG/DL (ref 37–140)
VLDLC SERPL CALC-MCNC: 21 MG/DL
WBC # SPEC AUTO: 4.2 X10(3)/MCL (ref 4.5–11.5)

## 2023-04-11 PROCEDURE — 85651 RBC SED RATE NONAUTOMATED: CPT

## 2023-04-11 PROCEDURE — 82728 ASSAY OF FERRITIN: CPT

## 2023-04-11 PROCEDURE — 80061 LIPID PANEL: CPT

## 2023-04-11 PROCEDURE — 85025 COMPLETE CBC W/AUTO DIFF WBC: CPT

## 2023-04-11 PROCEDURE — 82306 VITAMIN D 25 HYDROXY: CPT

## 2023-04-11 PROCEDURE — 36415 COLL VENOUS BLD VENIPUNCTURE: CPT

## 2023-04-11 PROCEDURE — 80053 COMPREHEN METABOLIC PANEL: CPT

## 2023-04-11 PROCEDURE — 85610 PROTHROMBIN TIME: CPT

## 2023-04-11 PROCEDURE — 86140 C-REACTIVE PROTEIN: CPT

## 2023-04-24 ENCOUNTER — OFFICE VISIT (OUTPATIENT)
Dept: FAMILY MEDICINE | Facility: CLINIC | Age: 68
End: 2023-04-24
Payer: MEDICARE

## 2023-04-24 VITALS
OXYGEN SATURATION: 99 % | SYSTOLIC BLOOD PRESSURE: 132 MMHG | HEIGHT: 65 IN | RESPIRATION RATE: 20 BRPM | TEMPERATURE: 97 F | DIASTOLIC BLOOD PRESSURE: 68 MMHG | BODY MASS INDEX: 26.3 KG/M2 | WEIGHT: 157.88 LBS | HEART RATE: 72 BPM

## 2023-04-24 DIAGNOSIS — R41.3 IMPAIRED MEMORY: Primary | ICD-10-CM

## 2023-04-24 PROBLEM — K82.4 POLYP OF GALLBLADDER: Status: ACTIVE | Noted: 2017-05-23

## 2023-04-24 PROBLEM — K22.70 BARRETT'S ESOPHAGUS: Status: ACTIVE | Noted: 2023-04-24

## 2023-04-24 PROBLEM — K74.2: Status: ACTIVE | Noted: 2023-04-24

## 2023-04-24 PROBLEM — K21.9 GASTROESOPHAGEAL REFLUX DISEASE: Status: ACTIVE | Noted: 2023-04-24

## 2023-04-24 PROBLEM — K74.3 PRIMARY BILIARY CHOLANGITIS: Status: ACTIVE | Noted: 2023-04-24

## 2023-04-24 PROBLEM — K29.00 ACUTE GASTRITIS: Status: ACTIVE | Noted: 2017-05-23

## 2023-04-24 PROBLEM — I10 HYPERTENSION: Status: ACTIVE | Noted: 2023-04-24

## 2023-04-24 PROBLEM — Z80.0 FAMILY HISTORY OF MALIGNANT NEOPLASM OF GASTROINTESTINAL TRACT: Status: ACTIVE | Noted: 2023-04-24

## 2023-04-24 LAB
FOLATE SERPL-MCNC: 13.6 NG/ML (ref 7–31.4)
HIV 1+2 AB+HIV1 P24 AG SERPL QL IA: NONREACTIVE
T PALLIDUM AB SER QL: NONREACTIVE
TSH SERPL-ACNC: 2.29 UIU/ML (ref 0.35–4.94)
VIT B12 SERPL-MCNC: 401 PG/ML (ref 213–816)

## 2023-04-24 PROCEDURE — 82746 ASSAY OF FOLIC ACID SERUM: CPT | Performed by: FAMILY MEDICINE

## 2023-04-24 PROCEDURE — 36415 COLL VENOUS BLD VENIPUNCTURE: CPT | Performed by: FAMILY MEDICINE

## 2023-04-24 PROCEDURE — 87389 HIV-1 AG W/HIV-1&-2 AB AG IA: CPT | Performed by: FAMILY MEDICINE

## 2023-04-24 PROCEDURE — 82607 VITAMIN B-12: CPT | Performed by: FAMILY MEDICINE

## 2023-04-24 PROCEDURE — 86780 TREPONEMA PALLIDUM: CPT | Performed by: FAMILY MEDICINE

## 2023-04-24 PROCEDURE — 84443 ASSAY THYROID STIM HORMONE: CPT | Performed by: FAMILY MEDICINE

## 2023-04-24 PROCEDURE — 99215 OFFICE O/P EST HI 40 MIN: CPT | Mod: PBBFAC | Performed by: FAMILY MEDICINE

## 2023-04-24 RX ORDER — ASPIRIN 81 MG/1
81 TABLET ORAL DAILY
COMMUNITY

## 2023-04-24 NOTE — PROGRESS NOTES
[facility]  [facility] Geriatric Clinic Note    DOS: 2023      Subjective:  Chief Complaint:    Chief Complaint   Patient presents with    Establish Care     [facility] Assessment       History of Present Illness:  Mary Beth Henson is a 67 y.o. female with a PMH of Anxiety, HLD, GERD, PBC who presents to geriatric clinic today for: Establishing Care in Geriatric Assessment Clinic    Reports family history of memory loss in father and paternal aunt. Father without official diagnosis of dementia, signs of memory loss after wife passed in 70-80's. Symptoms include mean, anger, outburst. Required assistance with bills and cooking. No memory loss in mother when passed at age 76.     Personal hx of forgetfulness. Ex: forgetting about cooking multiple times causing food to burn. Placing items and unable to find later. Decreased short term memory. Symptoms for about 1 year. Retired 1 year ago, mild symptoms at time of retired. Takes care of 3 year old great-grandchild. Denies getting lost when driving. Reports restful sleep.     PMH:  HTN- previously on medication causing hypotension, discontinued   Anxiety- Dx 61 yo, Citalopram 20 mg daily, helps with 75% reduction in anxiety     SocialHx:   Cigarettes: Start 23 yo, Quit 56 yo, 2 pack per day   Alcohol: Sober for 5 days, social drinker when consume    Past Medical History:   Diagnosis Date    Anxiety     GERD (gastroesophageal reflux disease)     H pylori ulcer 2017    Hyperlipidemia     Hypertension     Primary biliary cholangitis       Past Surgical History:   Procedure Laterality Date     SECTION      ESOPHAGOGASTRODUODENOSCOPY  2017    mucosa suggestive of Brasher's.     FISTULA REPAIR      LIVER BIOPSY  2017    UPPER ENDOSCOPIC ULTRASOUND W/ FNA  2017    normal    UPPER GASTROINTESTINAL ENDOSCOPY        Family History   Problem Relation Age of Onset    Breast cancer Mother     Hypertension Mother      Heart disease Mother     Heart disease Father     Hypertension Father     Colon cancer Maternal Aunt       Social History     Socioeconomic History    Marital status:     Number of children: 1   Occupational History    Occupation: retired   Tobacco Use    Smoking status: Former    Smokeless tobacco: Never   Substance and Sexual Activity    Alcohol use: No    Drug use: Never    Sexual activity: Not Currently        Review of patient's allergies indicates:  No Known Allergies       Current Outpatient Medications:     aspirin (ECOTRIN) 81 MG EC tablet, Take 81 mg by mouth once daily., Disp: , Rfl:     citalopram (CELEXA) 20 MG tablet, TAKE ONE TABLET BY MOUTH EVERY DAY, Disp: 90 tablet, Rfl: 3    fenofibrate (TRICOR) 145 MG tablet, Take 145 mg by mouth once daily., Disp: , Rfl:     fish oil-omega-3 fatty acids 300-1,000 mg capsule, Take 2 g by mouth once daily., Disp: , Rfl:     OCALIVA 5 mg Tab, Take 1 tablet by mouth once daily., Disp: , Rfl:     pantoprazole (PROTONIX) 40 MG tablet, Take 40 mg by mouth once daily., Disp: , Rfl:     ursodioL (ACTIGALL) 300 mg capsule, Take 300 mg by mouth 2 (two) times daily., Disp: , Rfl:      Review of Systems   HENT:  Negative for hearing loss.    Eyes:  Negative for discharge.   Respiratory:  Negative for wheezing.    Cardiovascular:  Negative for chest pain and palpitations.   Gastrointestinal:  Negative for blood in stool, constipation, diarrhea and vomiting.   Genitourinary:  Negative for dysuria and hematuria.   Musculoskeletal:  Negative for neck pain.   Neurological:  Negative for weakness and headaches.   Endo/Heme/Allergies:  Negative for polydipsia.   Psychiatric/Behavioral:            MD reviewed and updated patient provided answers      Objective:   Vitals:    04/24/23 1308 04/24/23 1314   BP: (!) 154/94 132/68   BP Location: Left arm Left arm   Patient Position: Sitting Sitting   BP Method: Medium (Automatic) Medium (Automatic)   Pulse: 68 72   Resp: 20   "  Temp: 96.8 °F (36 °C)    TempSrc: Oral    SpO2: 99%    Weight: 71.6 kg (157 lb 13.6 oz)    Height: 5' 5" (1.651 m)         Physical Exam  Constitutional:       Appearance: Normal appearance.   HENT:      Head: Normocephalic and atraumatic.      Mouth/Throat:      Mouth: Mucous membranes are moist.      Pharynx: Oropharynx is clear.   Eyes:      Conjunctiva/sclera: Conjunctivae normal.      Pupils: Pupils are equal, round, and reactive to light.   Cardiovascular:      Rate and Rhythm: Normal rate and regular rhythm.      Pulses: Normal pulses.   Pulmonary:      Effort: Pulmonary effort is normal.   Abdominal:      General: Abdomen is flat.   Musculoskeletal:         General: Normal range of motion.      Right lower leg: No edema.      Left lower leg: No edema.   Skin:     General: Skin is warm.      Capillary Refill: Capillary refill takes less than 2 seconds.   Neurological:      General: No focal deficit present.      Mental Status: She is alert.   Psychiatric:         Mood and Affect: Mood normal.         Behavior: Behavior normal.         Thought Content: Thought content normal.         Judgment: Judgment normal.        Geriatric Assessment:     Activities of Daily Living (ADLs):  Walking independent  Transferring independent  Feeding independent  Bathing independent  Toileting independent  Dressing/Grooming independent    Instrumental Activities of Daily Living (IADLs):  Finances independent  Transportation independent  Cooking independent  Cleaning/Laundry independent  Shopping independent  Telephone / Communication independent  Medications independent    Cognitive Assessment:  SLUMS performed date: 4/24/23 and scanned to patient's chart in media, Score 25/30    Depression Assessment:   Depression Patient Health Questionnaire 4/24/2023 4/24/2023 11/17/2022   Over the last two weeks how often have you been bothered by little interest or pleasure in doing things Not at all Not at all Not at all   Over the last " two weeks how often have you been bothered by feeling down, depressed or hopeless Not at all Not at all Not at all   PHQ-2 Total Score 0 0 0   Over the last two weeks how often have you been bothered by trouble falling or staying asleep, or sleeping too much Not at all - -   Over the last two weeks how often have you been bothered by feeling tired or having little energy Not at all - -   Over the last two weeks how often have you been bothered by a poor appetite or overeating Not at all - -   Over the last two weeks how often have you been bothered by feeling bad about yourself - or that you are a failure or have let yourself or your family down Not at all - -   Over the last two weeks how often have you been bothered by trouble concentrating on things, such as reading the newspaper or watching television More than half the days - -   Over the last two weeks how often have you been bothered by moving or speaking so slowly that other people could have noticed. Or the opposite - being so fidgety or restless that you have been moving around a lot more than usual. Nearly every day - -   Over the last two weeks how often have you been bothered by thoughts that you would be better off dead, or of hurting yourself Not at all - -   If you checked off any problems, how difficult have these problems made it for you to do your work, take care of things at home or get along with other people? Not difficult at all - -   Total Score 5 - -   Interpretation Mild - -     Assistive Devices:   none  Glasses: no  Hearing Aids: no  Dentures: no    Social support/Living Situation: Daughter, Two grandsons, One great grandson    Polypharmacy Identified? (>9 meds) no    Advanced Care Planning:  - [place] POST: not done yet, counseled patient and information provided    Recent labs:  CBC:  Lab Results   Component Value Date    WBC 4.2 (L) 04/11/2023    RBC 4.65 04/11/2023    HGB 13.3 04/11/2023    HCT 41.3 04/11/2023    MCV 88.8 04/11/2023    MCH  28.6 04/11/2023    MCHC 32.2 (L) 04/11/2023    RDW 13.6 04/11/2023     04/11/2023    MPV 11.8 (H) 04/11/2023      CMP:  Sodium Level   Date Value Ref Range Status   04/11/2023 144 136 - 145 mmol/L Final     Potassium Level   Date Value Ref Range Status   04/11/2023 5.0 3.5 - 5.1 mmol/L Final     Carbon Dioxide   Date Value Ref Range Status   04/11/2023 27 23 - 31 mmol/L Final     Blood Urea Nitrogen   Date Value Ref Range Status   04/11/2023 20.0 9.8 - 20.1 mg/dL Final     Creatinine   Date Value Ref Range Status   04/11/2023 1.11 (H) 0.55 - 1.02 mg/dL Final     Calcium Level Total   Date Value Ref Range Status   04/11/2023 10.4 (H) 8.4 - 10.2 mg/dL Final     Albumin Level   Date Value Ref Range Status   04/11/2023 4.3 3.4 - 4.8 g/dL Final     Bilirubin Total   Date Value Ref Range Status   04/11/2023 0.5 <=1.5 mg/dL Final     Alkaline Phosphatase   Date Value Ref Range Status   04/11/2023 72 40 - 150 unit/L Final     Aspartate Aminotransferase   Date Value Ref Range Status   04/11/2023 29 5 - 34 unit/L Final     Alanine Aminotransferase   Date Value Ref Range Status   04/11/2023 29 0 - 55 unit/L Final     Estimated GFR-Non    Date Value Ref Range Status   06/01/2022 43 mls/min/1.73/m2 Final      BMP:  Lab Results   Component Value Date     04/11/2023    K 5.0 04/11/2023    CO2 27 04/11/2023    BUN 20.0 04/11/2023    CREATININE 1.11 (H) 04/11/2023    CALCIUM 10.4 (H) 04/11/2023    EGFRNONAA 43 06/01/2022      Lipid Panel:  Lab Results   Component Value Date    CHOL 232 (H) 04/11/2023    CHOL 202 (H) 09/28/2022    CHOL 230 (H) 06/01/2022     Lab Results   Component Value Date    HDL 61 (H) 04/11/2023    HDL 52 09/28/2022    HDL 61 (H) 06/01/2022     No results found for: LDLCALC  Lab Results   Component Value Date    TRIG 104 04/11/2023    TRIG 75 09/28/2022    TRIG 84 06/01/2022     No results found for: CHOLHDL   HbA1c:  Lab Results   Component Value Date    HGBA1C 5.4 09/28/2022       TSH:  Lab Results   Component Value Date    TSH 1.3496 06/01/2022     Assessment & Plan:    1. Impaired memory      SLUMS indicative of Mild Cognitive impairment   Labs ordered today: Folate, B12, TSH, HIV, Syphilis  Other labs reviewed, ASCVD risk 6.1%  Given risk factors for vascular dementia, recommend continuing daily low dose aspirin  Discussion of low salt and cholesterol lowering diet   Recommend daily moderate daily activity at least 5 days per week  Discussion of memory promoting activities  Offered Donepezil, discussion of risk vs benefits. Patient education given. Call clinic if wishes to start the medication    Return to clinic in 6 month for lab results    Ashley Murguia MD  [facility] HO-3

## 2023-04-24 NOTE — PATIENT INSTRUCTIONS
Memory promoting activities:  Reading   Dominoes  Cards   Sohail  Chess  Checkers  Learning new skills

## 2023-05-01 ENCOUNTER — PATIENT MESSAGE (OUTPATIENT)
Dept: ADMINISTRATIVE | Facility: HOSPITAL | Age: 68
End: 2023-05-01
Payer: MEDICARE

## 2023-05-02 ENCOUNTER — PATIENT OUTREACH (OUTPATIENT)
Dept: ADMINISTRATIVE | Facility: HOSPITAL | Age: 68
End: 2023-05-02
Payer: MEDICARE

## 2023-05-02 NOTE — PROGRESS NOTES
Population Health. Out Reach. Reviewing patient's chart for quality metrics. Records request sent to Dr. Torrez for Colonoscopy.

## 2023-05-02 NOTE — LETTER
AUTHORIZATION FOR RELEASE OF   CONFIDENTIAL INFORMATION    Dear Dr. Torrez, [phone]    We are seeing Mary Beth Henson, date of birth 1955, in the clinic at [facility]. Keyshawn Florez DO is the patient's PCP. Mary Beth Henson has an outstanding lab/procedure at the time we reviewed her chart. In order to help keep her health information updated, she has authorized us to request the following medical record(s):        (  )  MAMMOGRAM                                      ( X )  COLONOSCOPY      (  )  PAP SMEAR                                          (  )  OUTSIDE LAB RESULTS     (  )  DEXA SCAN                                          (  )  EYE EXAM            (  )  FOOT EXAM                                          (  )  ENTIRE RECORD     (  )  OUTSIDE IMMUNIZATIONS                 (  )  _______________         Please fax records to [facility], Quinn Quebodeaux, DO, [phone]     If you have any questions you can contact Ambreen Caceres at [phone].       Patient Name: Mary Beth Henson  : 1955  Patient Phone #: [phone]

## 2023-05-31 ENCOUNTER — DOCUMENTATION ONLY (OUTPATIENT)
Dept: FAMILY MEDICINE | Facility: CLINIC | Age: 68
End: 2023-05-31
Payer: MEDICARE

## 2023-06-06 ENCOUNTER — HOSPITAL ENCOUNTER (OUTPATIENT)
Dept: CARDIOLOGY | Facility: HOSPITAL | Age: 68
Discharge: HOME | End: 2023-06-06
Attending: NURSE PRACTITIONER
Payer: MEDICARE

## 2023-06-06 ENCOUNTER — HOSPITAL ENCOUNTER (OUTPATIENT)
Dept: RADIOLOGY | Facility: HOSPITAL | Age: 68
Discharge: HOME | End: 2023-06-06
Attending: NURSE PRACTITIONER
Payer: MEDICARE

## 2023-06-06 DIAGNOSIS — R06.02 SOB (SHORTNESS OF BREATH): ICD-10-CM

## 2023-06-06 DIAGNOSIS — R06.09 DOE (DYSPNEA ON EXERTION): ICD-10-CM

## 2023-06-06 DIAGNOSIS — Z82.49 FAMILY HISTORY OF CORONARY ARTERY DISEASE: ICD-10-CM

## 2023-06-06 DIAGNOSIS — R00.2 PALPITATIONS: ICD-10-CM

## 2023-06-06 LAB
AORTIC ROOT ANNULUS: 3.46 CM
AORTIC VALVE CUSP SEPERATION: 1.02 CM
AV PEAK GRADIENT: 5 MMHG
CV ECHO LV RWT: 0.44 CM
DOP CALC AO PEAK VEL: 1.17 M/S
E WAVE DECELERATION TIME: 262.69 MSEC
E/A RATIO: 0.91
ECHO LV POSTERIOR WALL: 0.92 CM (ref 0.6–1.1)
EJECTION FRACTION: 57 %
FRACTIONAL SHORTENING: 30 % (ref 28–44)
INTERVENTRICULAR SEPTUM: 0.93 CM (ref 0.6–1.1)
LEFT ATRIUM SIZE: 0 CM
LEFT INTERNAL DIMENSION IN SYSTOLE: 2.9 CM (ref 2.1–4)
LEFT VENTRICLE DIASTOLIC VOLUME: 76.42 ML
LEFT VENTRICLE SYSTOLIC VOLUME: 32.3 ML
LEFT VENTRICULAR INTERNAL DIMENSION IN DIASTOLE: 4.15 CM (ref 3.5–6)
LEFT VENTRICULAR MASS: 120.85 G
MV PEAK A VEL: 0.93 M/S
MV PEAK E VEL: 0.85 M/S
MV STENOSIS PRESSURE HALF TIME: 76.18 MS
MV VALVE AREA P 1/2 METHOD: 2.89 CM2
PISA MRMAX VEL: 4.63 M/S
PISA TR MAX VEL: 2.57 M/S
PV PEAK VELOCITY: 0.83 CM/S
RA PRESSURE: 3 MMHG
RIGHT VENTRICULAR END-DIASTOLIC DIMENSION: 2.5 CM
TR MAX PG: 26 MMHG
TRICUSPID VALVE PEAK A WAVE VELOCITY: 0.49 M/S
TV PEAK E VEL: 0.6 M/S
TV REST PULMONARY ARTERY PRESSURE: 29 MMHG
TV STENOSIS PRESSURE HALF TIME: 47.67 MS
TV VALVE AREA P 1/2 METHOD: 3.99 CM2

## 2023-06-06 PROCEDURE — 93306 TTE W/DOPPLER COMPLETE: CPT

## 2023-06-06 PROCEDURE — 75571 CT HRT W/O DYE W/CA TEST: CPT | Mod: TC

## 2023-08-28 ENCOUNTER — LAB VISIT (OUTPATIENT)
Dept: LAB | Facility: HOSPITAL | Age: 68
End: 2023-08-28
Attending: NURSE PRACTITIONER
Payer: MEDICARE

## 2023-08-28 DIAGNOSIS — E78.2 MIXED HYPERLIPIDEMIA: Primary | ICD-10-CM

## 2023-08-28 LAB
ALBUMIN SERPL-MCNC: 4.2 G/DL (ref 3.4–4.8)
ALBUMIN/GLOB SERPL: 1.3 RATIO (ref 1.1–2)
ALP SERPL-CCNC: 55 UNIT/L (ref 40–150)
ALT SERPL-CCNC: 22 UNIT/L (ref 0–55)
AST SERPL-CCNC: 23 UNIT/L (ref 5–34)
BILIRUB SERPL-MCNC: 0.5 MG/DL
BUN SERPL-MCNC: 27 MG/DL (ref 9.8–20.1)
CALCIUM SERPL-MCNC: 10.2 MG/DL (ref 8.4–10.2)
CHLORIDE SERPL-SCNC: 110 MMOL/L (ref 98–107)
CHOLEST SERPL-MCNC: 145 MG/DL
CHOLEST/HDLC SERPL: 3 {RATIO} (ref 0–5)
CO2 SERPL-SCNC: 27 MMOL/L (ref 23–31)
CREAT SERPL-MCNC: 1.21 MG/DL (ref 0.55–1.02)
GFR SERPLBLD CREATININE-BSD FMLA CKD-EPI: 50 MLS/MIN/1.73/M2
GLOBULIN SER-MCNC: 3.3 GM/DL (ref 2.4–3.5)
GLUCOSE SERPL-MCNC: 103 MG/DL (ref 82–115)
HDLC SERPL-MCNC: 56 MG/DL (ref 35–60)
LDLC SERPL CALC-MCNC: 72 MG/DL (ref 50–140)
POTASSIUM SERPL-SCNC: 5.5 MMOL/L (ref 3.5–5.1)
PROT SERPL-MCNC: 7.5 GM/DL (ref 5.8–7.6)
SODIUM SERPL-SCNC: 144 MMOL/L (ref 136–145)
TRIGL SERPL-MCNC: 87 MG/DL (ref 37–140)
VLDLC SERPL CALC-MCNC: 17 MG/DL

## 2023-08-28 PROCEDURE — 80053 COMPREHEN METABOLIC PANEL: CPT

## 2023-08-28 PROCEDURE — 80061 LIPID PANEL: CPT

## 2023-08-28 PROCEDURE — 36415 COLL VENOUS BLD VENIPUNCTURE: CPT

## 2023-09-01 ENCOUNTER — LAB VISIT (OUTPATIENT)
Dept: LAB | Facility: HOSPITAL | Age: 68
End: 2023-09-01
Attending: NURSE PRACTITIONER
Payer: MEDICARE

## 2023-09-01 DIAGNOSIS — E78.5 HYPERLIPIDEMIA, UNSPECIFIED HYPERLIPIDEMIA TYPE: ICD-10-CM

## 2023-09-01 DIAGNOSIS — K21.9 GASTROESOPHAGEAL REFLUX DISEASE, UNSPECIFIED WHETHER ESOPHAGITIS PRESENT: ICD-10-CM

## 2023-09-01 DIAGNOSIS — K74.3 CHOLANGITIC CIRRHOSIS: ICD-10-CM

## 2023-09-01 DIAGNOSIS — K22.70 BARRETT'S ESOPHAGUS: ICD-10-CM

## 2023-09-01 DIAGNOSIS — K74.2 HEPATIC FIBROSIS WITH HEPATIC SCLEROSIS: ICD-10-CM

## 2023-09-01 DIAGNOSIS — K57.30 DIVERTICULOSIS OF LARGE INTESTINE WITHOUT DIVERTICULITIS: ICD-10-CM

## 2023-09-01 LAB
AFP-TM SERPL-MCNC: 2.2 NG/ML
EST. AVERAGE GLUCOSE BLD GHB EST-MCNC: 111.2 MG/DL
HBA1C MFR BLD: 5.5 %

## 2023-09-01 PROCEDURE — 36415 COLL VENOUS BLD VENIPUNCTURE: CPT

## 2023-09-01 PROCEDURE — 83036 HEMOGLOBIN GLYCOSYLATED A1C: CPT

## 2023-09-01 PROCEDURE — 82105 ALPHA-FETOPROTEIN SERUM: CPT

## 2023-09-06 ENCOUNTER — LAB VISIT (OUTPATIENT)
Dept: LAB | Facility: HOSPITAL | Age: 68
End: 2023-09-06
Attending: NURSE PRACTITIONER
Payer: MEDICARE

## 2023-09-06 DIAGNOSIS — R06.09 DYSPNEA ON EXERTION: Primary | ICD-10-CM

## 2023-09-06 DIAGNOSIS — R06.02 SHORTNESS OF BREATH: ICD-10-CM

## 2023-09-06 DIAGNOSIS — E78.2 MIXED HYPERLIPIDEMIA: ICD-10-CM

## 2023-09-06 LAB
ANION GAP SERPL CALC-SCNC: 10 MEQ/L
BUN SERPL-MCNC: 27 MG/DL (ref 9.8–20.1)
CALCIUM SERPL-MCNC: 10.5 MG/DL (ref 8.4–10.2)
CHLORIDE SERPL-SCNC: 108 MMOL/L (ref 98–107)
CO2 SERPL-SCNC: 26 MMOL/L (ref 23–31)
CREAT SERPL-MCNC: 1.3 MG/DL (ref 0.55–1.02)
CREAT/UREA NIT SERPL: 21
GFR SERPLBLD CREATININE-BSD FMLA CKD-EPI: 45 MLS/MIN/1.73/M2
GLUCOSE SERPL-MCNC: 96 MG/DL (ref 82–115)
POTASSIUM SERPL-SCNC: 5 MMOL/L (ref 3.5–5.1)
SODIUM SERPL-SCNC: 144 MMOL/L (ref 136–145)

## 2023-09-06 PROCEDURE — 80048 BASIC METABOLIC PNL TOTAL CA: CPT

## 2023-09-06 PROCEDURE — 36415 COLL VENOUS BLD VENIPUNCTURE: CPT

## 2023-09-27 ENCOUNTER — HOSPITAL ENCOUNTER (OUTPATIENT)
Dept: RADIOLOGY | Facility: HOSPITAL | Age: 68
Discharge: HOME | End: 2023-09-27
Attending: NURSE PRACTITIONER
Payer: MEDICARE

## 2023-09-27 DIAGNOSIS — K74.3 PRIMARY BILIARY CHOLANGITIS: ICD-10-CM

## 2023-09-27 DIAGNOSIS — K22.70 BARRETT ESOPHAGUS: ICD-10-CM

## 2023-09-27 DIAGNOSIS — K74.2 HEPATIC FIBROSIS WITH HEPATIC SCLEROSIS: ICD-10-CM

## 2023-09-27 DIAGNOSIS — K59.00 CONSTIPATION, ACUTE: ICD-10-CM

## 2023-09-27 DIAGNOSIS — K57.30 DIVERTICULOSIS OF LARGE INTESTINE WITHOUT PERFORATION OR ABSCESS WITHOUT BLEEDING: ICD-10-CM

## 2023-09-27 DIAGNOSIS — K21.9 GASTROESOPHAGEAL REFLUX DISEASE: ICD-10-CM

## 2023-09-27 PROCEDURE — 76700 US EXAM ABDOM COMPLETE: CPT | Mod: TC

## 2023-10-19 ENCOUNTER — TELEPHONE (OUTPATIENT)
Dept: FAMILY MEDICINE | Facility: CLINIC | Age: 68
End: 2023-10-19
Payer: MEDICARE

## 2023-10-19 DIAGNOSIS — Z12.31 BREAST CANCER SCREENING BY MAMMOGRAM: ICD-10-CM

## 2023-10-19 DIAGNOSIS — Z00.00 WELLNESS EXAMINATION: Primary | ICD-10-CM

## 2023-10-19 DIAGNOSIS — I10 HYPERTENSION, UNSPECIFIED TYPE: ICD-10-CM

## 2023-10-19 DIAGNOSIS — Z11.59 ENCOUNTER FOR HEPATITIS C SCREENING TEST FOR LOW RISK PATIENT: ICD-10-CM

## 2023-10-19 NOTE — TELEPHONE ENCOUNTER
----- Message from Odalys Lane sent at 10/19/2023  9:56 AM CDT -----  Patient has wellness next month please send mammogram order also when sending wellness labs to the [facility]

## 2023-10-27 NOTE — PROGRESS NOTES
Date of Service: 4/24/2023    Attending Attestation: Patient discussed with resident. The chart was reviewed thoroughly including pertinent vitals, labs, imaging, medications, prior notes, and consultant/specialist recommendations.  I participated in the management of the patient, examined the patient, reviewed the summary of the plan, and was immediately available at all times throughout the encounter. Services were furnished in a primary care center located in the outpatient department of a [facility] hospital. I agree with the resident's findings and plan as documented in the resident's note.    Nela Ryan MD  Attending - Family Medicine / Geriatric Medicine  [facility] [place], [facility]

## 2023-11-08 ENCOUNTER — HOSPITAL ENCOUNTER (OUTPATIENT)
Dept: RADIOLOGY | Facility: HOSPITAL | Age: 68
Discharge: HOME | End: 2023-11-08
Attending: FAMILY MEDICINE
Payer: MEDICARE

## 2023-11-08 ENCOUNTER — TELEPHONE (OUTPATIENT)
Dept: FAMILY MEDICINE | Facility: CLINIC | Age: 68
End: 2023-11-08
Payer: MEDICARE

## 2023-11-08 DIAGNOSIS — Z12.31 BREAST CANCER SCREENING BY MAMMOGRAM: ICD-10-CM

## 2023-11-08 PROCEDURE — 77063 BREAST TOMOSYNTHESIS BI: CPT | Mod: 26,,, | Performed by: RADIOLOGY

## 2023-11-08 PROCEDURE — 77067 SCR MAMMO BI INCL CAD: CPT | Mod: TC

## 2023-11-08 PROCEDURE — 77067 SCR MAMMO BI INCL CAD: CPT | Mod: 26,,, | Performed by: RADIOLOGY

## 2023-11-08 PROCEDURE — 77067 MAMMO DIGITAL SCREENING BILAT WITH TOMO: ICD-10-PCS | Mod: 26,,, | Performed by: RADIOLOGY

## 2023-11-08 PROCEDURE — 77063 MAMMO DIGITAL SCREENING BILAT WITH TOMO: ICD-10-PCS | Mod: 26,,, | Performed by: RADIOLOGY

## 2023-11-08 NOTE — TELEPHONE ENCOUNTER
----- Message from Keyshawn Florez DO sent at 11/8/2023 11:08 AM CST -----  Normal MMG. Repeat in 1 year.

## 2023-11-09 ENCOUNTER — TELEPHONE (OUTPATIENT)
Dept: FAMILY MEDICINE | Facility: CLINIC | Age: 68
End: 2023-11-09
Payer: MEDICARE

## 2023-11-09 NOTE — TELEPHONE ENCOUNTER
----- Message from Keyshawn Florez DO sent at 11/8/2023  5:02 PM CST -----  All lab results within acceptable ranges.

## 2023-11-15 ENCOUNTER — TELEPHONE (OUTPATIENT)
Dept: FAMILY MEDICINE | Facility: CLINIC | Age: 68
End: 2023-11-15
Payer: MEDICARE

## 2023-11-20 ENCOUNTER — OFFICE VISIT (OUTPATIENT)
Dept: FAMILY MEDICINE | Facility: CLINIC | Age: 68
End: 2023-11-20
Payer: MEDICARE

## 2023-11-20 VITALS
RESPIRATION RATE: 16 BRPM | HEIGHT: 65 IN | SYSTOLIC BLOOD PRESSURE: 136 MMHG | OXYGEN SATURATION: 99 % | BODY MASS INDEX: 26.05 KG/M2 | TEMPERATURE: 98 F | DIASTOLIC BLOOD PRESSURE: 85 MMHG | HEART RATE: 80 BPM | WEIGHT: 156.38 LBS

## 2023-11-20 DIAGNOSIS — N18.31 CHRONIC KIDNEY DISEASE, STAGE 3A: ICD-10-CM

## 2023-11-20 DIAGNOSIS — F41.1 GAD (GENERALIZED ANXIETY DISORDER): ICD-10-CM

## 2023-11-20 DIAGNOSIS — Z00.00 ROUTINE GENERAL MEDICAL EXAMINATION AT A HEALTH CARE FACILITY: Primary | ICD-10-CM

## 2023-11-20 PROBLEM — K44.9 DIAPHRAGMATIC HERNIA WITHOUT OBSTRUCTION OR GANGRENE: Status: ACTIVE | Noted: 2021-07-19

## 2023-11-20 PROBLEM — K57.30 DVRTCLOS OF LG INT W/O PERFORATION OR ABSCESS W/O BLEEDING: Status: ACTIVE | Noted: 2021-07-19

## 2023-11-20 PROBLEM — I10 PRIMARY HYPERTENSION: Chronic | Status: ACTIVE | Noted: 2023-04-24

## 2023-11-20 PROCEDURE — G0439 PPPS, SUBSEQ VISIT: HCPCS | Mod: ,,, | Performed by: FAMILY MEDICINE

## 2023-11-20 PROCEDURE — G0439 PR MEDICARE ANNUAL WELLNESS SUBSEQUENT VISIT: ICD-10-PCS | Mod: ,,, | Performed by: FAMILY MEDICINE

## 2023-11-20 RX ORDER — CHOLECALCIFEROL (VITAMIN D3) 50 MCG
30 TABLET ORAL DAILY
COMMUNITY
End: 2024-02-05

## 2023-11-20 RX ORDER — DOCUSATE SODIUM 100 MG/1
100 CAPSULE, LIQUID FILLED ORAL 2 TIMES DAILY PRN
COMMUNITY
Start: 2023-06-12

## 2023-11-20 RX ORDER — BIOTIN 10 MG
TABLET ORAL
COMMUNITY
End: 2024-02-05

## 2023-11-20 RX ORDER — ESCITALOPRAM OXALATE 20 MG/1
20 TABLET ORAL DAILY
Qty: 30 TABLET | Refills: 11 | Status: SHIPPED | OUTPATIENT
Start: 2023-11-20 | End: 2023-12-05 | Stop reason: SINTOL

## 2023-11-20 RX ORDER — ALIROCUMAB 75 MG/ML
1 INJECTION, SOLUTION SUBCUTANEOUS
COMMUNITY
Start: 2023-11-06 | End: 2024-02-05

## 2023-11-20 RX ORDER — EVOLOCUMAB 140 MG/ML
1 INJECTION, SOLUTION SUBCUTANEOUS
COMMUNITY
Start: 2023-06-12

## 2023-11-20 NOTE — PROGRESS NOTES
Patient ID: [id number]     Chief Complaint: Medicare AWV        HPI:     Mary Beth Henson is a 68 y.o. female here today for Medicare AWV Anxiety, tried increasing her Celexa to 1.5 tablets daily with no improvement over the last month. Notices significant jaw tightness and teeth grinding.   ----------------------------  Anxiety  GERD (gastroesophageal reflux disease)  H pylori ulcer  Hyperlipidemia  Hypertension  Personal history of colonic polyps      Comment:  s/p polypectomy - Dr Davie Torrez  Primary biliary cholangitis     Past Surgical History:   Procedure Laterality Date     SECTION      COLONOSCOPY W/ BIOPSIES AND POLYPECTOMY  2021    Dr Davie Torrez    ESOPHAGOGASTRODUODENOSCOPY  2017    mucosa suggestive of Brasher's.     ESOPHAGOGASTRODUODENOSCOPY  2021    Dr Davie Torrez    EYE SURGERY  2023    FISTULA REPAIR      LIVER BIOPSY  2017    UPPER ENDOSCOPIC ULTRASOUND W/ FNA  2017    normal    UPPER GASTROINTESTINAL ENDOSCOPY         Review of patient's allergies indicates:  No Known Allergies    Outpatient Medications Marked as Taking for the 23 encounter (Office Visit) with Keyshawn Florez, DO   Medication Sig Dispense Refill    acidophilus-pectin, citrus 100 million cell-10 mg Cap capsule      aspirin (ECOTRIN) 81 MG EC tablet Take 81 mg by mouth once daily.      cholecalciferol, vitamin D3, (VITAMIN D3) 50 mcg (2,000 unit) Tab Take 30 tablets by mouth once daily.      docusate sodium (COLACE) 100 MG capsule Take 100 mg by mouth 2 (two) times daily as needed for Constipation.      fenofibrate (TRICOR) 145 MG tablet Take 145 mg by mouth once daily.      fish oil-omega-3 fatty acids 300-1,000 mg capsule Take 2 g by mouth once daily.      OCALIVA 5 mg Tab Take 1 tablet by mouth once daily.      pantoprazole (PROTONIX) 40 MG tablet Take 40 mg by mouth once daily.      PRALUENT PEN 75 mg/mL PnIj Inject 1 mL into the skin every 14 (fourteen) days.       ursodioL (ACTIGALL) 300 mg capsule Take 300 mg by mouth 2 (two) times daily.      [DISCONTINUED] citalopram (CELEXA) 20 MG tablet TAKE ONE TABLET BY MOUTH EVERY DAY 90 tablet 3       Social History     Socioeconomic History    Marital status:     Number of children: 1   Occupational History    Occupation: retired   Tobacco Use    Smoking status: Former    Smokeless tobacco: Never    Tobacco comments:     Quit years ago dont remember dates   Substance and Sexual Activity    Alcohol use: No    Drug use: Never    Sexual activity: Not Currently     Social Determinants of Health     Financial Resource Strain: Low Risk  (11/20/2023)    Overall Financial Resource Strain (CARDIA)     Difficulty of Paying Living Expenses: Not hard at all   Food Insecurity: No Food Insecurity (11/20/2023)    Hunger Vital Sign     Worried About Running Out of Food in the Last Year: Never true     Ran Out of Food in the Last Year: Never true   Transportation Needs: No Transportation Needs (11/20/2023)    PRAPARE - Transportation     Lack of Transportation (Medical): No     Lack of Transportation (Non-Medical): No   Physical Activity: Sufficiently Active (11/20/2023)    Exercise Vital Sign     Days of Exercise per Week: 5 days     Minutes of Exercise per Session: 30 min   Stress: Stress Concern Present (11/20/2023)    [employer] [employer] - Occupational Stress Questionnaire     Feeling of Stress : Rather much   Social Connections: Moderately Integrated (11/20/2023)    Social Connection and Isolation Panel [NHANES]     Frequency of Communication with Friends and Family: More than three times a week     Frequency of Social Gatherings with Friends and Family: Three times a week     Attends Nondenominational Services: 1 to 4 times per year     Active Member of Clubs or Organizations: No     Attends Club or Organization Meetings: 1 to 4 times per year     Marital Status:    Housing Stability: Low Risk  (11/20/2023)     "Housing Stability Vital Sign     Unable to Pay for Housing in the Last Year: No     Number of Places Lived in the Last Year: 1     Unstable Housing in the Last Year: No        Family History   Problem Relation Age of Onset    Breast cancer Mother     Hypertension Mother     Heart disease Mother     Heart disease Father     Hypertension Father     Arthritis Father     Colon cancer Maternal Aunt         Patient Care Team:  Keyshawn Florez DO as PCP - General (Family Medicine)  Davie Torrez MD as Consulting Physician (Gastroenterology)     Subjective:     Review of Systems   Constitutional:  Negative for chills and fever.   HENT:          Teeth grinding, jaw tightness   Respiratory:  Negative for shortness of breath.    Cardiovascular:  Negative for chest pain.   Gastrointestinal:  Negative for constipation and diarrhea.   Neurological:  Negative for dizziness and headaches.   Psychiatric/Behavioral:  Negative for depression. The patient is nervous/anxious. The patient does not have insomnia.      See HPI for details  All Other ROS: Negative except as stated in HPI.     Objective:     /85   Pulse 80   Temp 98.1 °F (36.7 °C) (Temporal)   Resp 16   Ht 5' 4.96" (1.65 m)   Wt 70.9 kg (156 lb 6.4 oz)   SpO2 99%   BMI 26.06 kg/m²     Physical Exam  Vitals reviewed.   Constitutional:       General: She is not in acute distress.     Appearance: Normal appearance.   Cardiovascular:      Rate and Rhythm: Normal rate and regular rhythm.      Heart sounds: No murmur heard.     No friction rub. No gallop.   Pulmonary:      Effort: No respiratory distress.      Breath sounds: No wheezing, rhonchi or rales.   Musculoskeletal:         General: No swelling, tenderness or deformity.      Right lower leg: No edema.      Left lower leg: No edema.   Skin:     General: Skin is warm and dry.      Findings: No lesion or rash.   Neurological:      General: No focal deficit present.      Mental Status: She is alert. "   Psychiatric:         Mood and Affect: Mood normal.         Assessment/Plan:     1. Routine general medical examination at a health care facility    2. Chronic kidney disease, stage 3a  Back to baseline as of 9/27/23. Will continue to monitor.   3. RUPERTO (generalized anxiety disorder)  -     EScitalopram oxalate (LEXAPRO) 20 MG tablet; Take 1 tablet (20 mg total) by mouth once daily.  Dispense: 30 tablet; Refill: 11  -Will try to switch from celexa to Lexapro. If patient does not tolerate, she was instructed to restart the celexa and contact the clinic.     Educated patient on the risks of serotonin based medications such as serotonin modulators and SSRIs/SNRIs.   Risks discussed include but were not limited to common side effects of the specific medications, risk for worsening symptoms of depression including development of suicidal thoughts or ideations, and serotonin syndrome.   Counseled patient on expected time course of treatment plan including potential benefits of medication not becoming noticeable until up to 6 weeks from start date.   Patient voiced understanding of the risks and plan.      Follow up:     Follow up in about 6 months (around 5/20/2024) for Follow up anxiety. In addition to their scheduled follow up, the patient has also been instructed to follow up on as needed basis.

## 2023-12-05 ENCOUNTER — TELEPHONE (OUTPATIENT)
Dept: FAMILY MEDICINE | Facility: CLINIC | Age: 68
End: 2023-12-05
Payer: MEDICARE

## 2023-12-05 DIAGNOSIS — F41.9 ANXIETY DISORDER, UNSPECIFIED: ICD-10-CM

## 2023-12-05 RX ORDER — CITALOPRAM 20 MG/1
20 TABLET, FILM COATED ORAL DAILY
Qty: 90 TABLET | Refills: 3 | Status: SHIPPED | OUTPATIENT
Start: 2023-12-05 | End: 2024-02-19

## 2023-12-05 NOTE — TELEPHONE ENCOUNTER
----- Message from Odalys Lane sent at 12/5/2023  8:05 AM CST -----  Patient was seen 11/20 duy switched her from celexa to lexapro 20mg.. she is currently taking only 1/2, she said duy told her to update him on how it was going, she said she has headaches and elevated blood pressure.  She will go back to taking celexa till duy figures out what he wants her to do, I offered her an appointment to come in discuss this with duy she said duy told her to just call

## 2023-12-06 NOTE — PROGRESS NOTES
Patient ID: [id number]     Chief Complaint: Medicare AWV        HPI:     Mayr Beth Henson is a 68 y.o. female here today for a Medicare Wellness. Anxiety, tried increasing her Celexa to 1.5 tablets daily with no improvement over the last month. Notices significant jaw tightness and teeth grinding.        ----------------------------  Anxiety  GERD (gastroesophageal reflux disease)  H pylori ulcer  Hyperlipidemia  Hypertension  Personal history of colonic polyps      Comment:  s/p polypectomy - Dr Davie Torrez  Primary biliary cholangitis     Past Surgical History:   Procedure Laterality Date     SECTION      COLONOSCOPY W/ BIOPSIES AND POLYPECTOMY  2021    Dr Davie Torrez    ESOPHAGOGASTRODUODENOSCOPY  2017    mucosa suggestive of Brasher's.     ESOPHAGOGASTRODUODENOSCOPY  2021    Dr Davie Torrez    EYE SURGERY  2023    FISTULA REPAIR      LIVER BIOPSY  2017    UPPER ENDOSCOPIC ULTRASOUND W/ FNA  2017    normal    UPPER GASTROINTESTINAL ENDOSCOPY         Review of patient's allergies indicates:  No Known Allergies    Outpatient Medications Marked as Taking for the 23 encounter (Office Visit) with Keyshawn Florez, DO   Medication Sig Dispense Refill    acidophilus-pectin, citrus 100 million cell-10 mg Cap capsule      aspirin (ECOTRIN) 81 MG EC tablet Take 81 mg by mouth once daily.      cholecalciferol, vitamin D3, (VITAMIN D3) 50 mcg (2,000 unit) Tab Take 30 tablets by mouth once daily.      docusate sodium (COLACE) 100 MG capsule Take 100 mg by mouth 2 (two) times daily as needed for Constipation.      fenofibrate (TRICOR) 145 MG tablet Take 145 mg by mouth once daily.      fish oil-omega-3 fatty acids 300-1,000 mg capsule Take 2 g by mouth once daily.      OCALIVA 5 mg Tab Take 1 tablet by mouth once daily.      pantoprazole (PROTONIX) 40 MG tablet Take 40 mg by mouth once daily.      PRALUENT PEN 75 mg/mL PnIj Inject 1 mL into the skin every 14  (fourteen) days.      ursodioL (ACTIGALL) 300 mg capsule Take 300 mg by mouth 2 (two) times daily.      [DISCONTINUED] citalopram (CELEXA) 20 MG tablet TAKE ONE TABLET BY MOUTH EVERY DAY 90 tablet 3       Social History     Socioeconomic History    Marital status:     Number of children: 1   Occupational History    Occupation: retired   Tobacco Use    Smoking status: Former    Smokeless tobacco: Never    Tobacco comments:     Quit years ago dont remember dates   Substance and Sexual Activity    Alcohol use: No    Drug use: Never    Sexual activity: Not Currently     Social Determinants of Health     Financial Resource Strain: Low Risk  (11/20/2023)    Overall Financial Resource Strain (CARDIA)     Difficulty of Paying Living Expenses: Not hard at all   Food Insecurity: No Food Insecurity (11/20/2023)    Hunger Vital Sign     Worried About Running Out of Food in the Last Year: Never true     Ran Out of Food in the Last Year: Never true   Transportation Needs: No Transportation Needs (11/20/2023)    PRAPARE - Transportation     Lack of Transportation (Medical): No     Lack of Transportation (Non-Medical): No   Physical Activity: Sufficiently Active (11/20/2023)    Exercise Vital Sign     Days of Exercise per Week: 5 days     Minutes of Exercise per Session: 30 min   Stress: Stress Concern Present (11/20/2023)    [employer] [place] [employer] - Occupational Stress Questionnaire     Feeling of Stress : Rather much   Social Connections: Moderately Integrated (11/20/2023)    Social Connection and Isolation Panel [NHANES]     Frequency of Communication with Friends and Family: More than three times a week     Frequency of Social Gatherings with Friends and Family: Three times a week     Attends [employer] Services: 1 to 4 times per year     Active Member of Clubs or Organizations: No     Attends Club or Organization Meetings: 1 to 4 times per year     Marital Status:    Housing Stability: Low Risk   (11/20/2023)    Housing Stability Vital Sign     Unable to Pay for Housing in the Last Year: No     Number of Places Lived in the Last Year: 1     Unstable Housing in the Last Year: No        Family History   Problem Relation Age of Onset    Breast cancer Mother     Hypertension Mother     Heart disease Mother     Heart disease Father     Hypertension Father     Arthritis Father     Colon cancer Maternal Aunt         Patient Care Team:  Keyshawn Florez DO as PCP - General (Family Medicine)  Davie Torrez MD as Consulting Physician (Gastroenterology)       Subjective:     Review of Systems   Constitutional:  Negative for chills and fever.   Respiratory:  Negative for shortness of breath.    Cardiovascular:  Negative for chest pain.   Gastrointestinal:  Negative for constipation and diarrhea.   Neurological:  Negative for headaches.   Psychiatric/Behavioral:  The patient is nervous/anxious. The patient does not have insomnia.      Patient Reported Health Risk Assessments:  What [age] your age[age] 65-69  Are you male or female?: Female  During the past four weeks, how much have you been bothered by emotional problems such as feeling anxious, depressed, irritable, sad, or downhearted and blue?: Moderately  During the past five weeks, has your physical and/or emotional health limited your social activities with family, friends, neighbors, or groups?: Not at all  During the past four weeks, how much bodily pain have you generally had?: Very mild pain  During the past four weeks, was someone available to help if you needed and wanted help?: Yes, as much as I wanted  During the past four weeks, what was the hardest physical activity you could do for at least two minutes?: Heavy  Can you get to places out of walking distance without help?  (For example, can you travel alone on buses or taxis, or drive your own car?): Yes  Can you go shopping for groceries or clothes without someone's help?: Yes  Can you prepare your  own meals?: Yes  Can you do your own housework without help?: Yes  Because of any health problems, do you need the help of another person with your personal care needs such as eating, bathing, dressing, or getting around the house?: No  Can you handle your own money without help?: Yes  During the past four weeks, how would you rate your health in general?: Very good  How have things been going for you during the past four weeks?: Very well  Are you having difficulties driving your car?: No  Do you always fasten your seat belt when you are in a car?: Yes, usually  How often in the past four weeks have you been bothered by falling or dizzy when standing up?: Never  How often in the past four weeks have you been bothered by sexual problems?: Never  How often in the past four weeks have you been bothered by trouble eating well?: Never  How often in the past four weeks have you been bothered by teeth or denture problems?: Never  How often in the past four weeks have you been bothered with problems using the telephone?: Never  How often in the past four weeks have you been bothered by tiredness or fatigue?: Never  Have you fallen two or more times in the past year?: No  Are you afraid of falling?: No  Are you a smoker?: No  During the past four weeks, how many drinks of wine, beer, or other alcoholic beverages did you have?: No alcohol at all  Do you exercise for about 20 minutes three or more days a week?: Yes, most of the time  Have you been given any information to help you with hazards in your house that might hurt you?: Yes  Have you been given any information to help you with keeping track of your medications?: Yes  How often do you have trouble taking medicines the way you've been told to take them?: I always take them as prescribed  How confident are you that you can control and manage most of your health problems?: Very confident  What is your race? (Check all that apply.):     Objective:     /85   " Pulse 80   Temp 98.1 °F (36.7 °C) (Temporal)   Resp 16   Ht 5' 4.96" (1.65 m)   Wt 70.9 kg (156 lb 6.4 oz)   SpO2 99%   BMI 26.06 kg/m²     Physical Exam  Vitals reviewed.   Constitutional:       General: She is not in acute distress.     Appearance: Normal appearance.   Cardiovascular:      Rate and Rhythm: Normal rate and regular rhythm.      Heart sounds: No murmur heard.     No friction rub. No gallop.   Pulmonary:      Effort: No respiratory distress.      Breath sounds: No wheezing, rhonchi or rales.   Musculoskeletal:         General: No swelling, tenderness or deformity.      Right lower leg: No edema.      Left lower leg: No edema.   Skin:     General: Skin is warm and dry.      Findings: No lesion or rash.   Neurological:      General: No focal deficit present.      Mental Status: She is alert.   Psychiatric:         Mood and Affect: Mood normal.             Assessment:       ICD-10-CM ICD-9-CM   1. Routine general medical examination at a health care facility  Z00.00 V70.0   2. Chronic kidney disease, stage 3a  N18.31 585.3   3. RUPERTO (generalized anxiety disorder)  F41.1 300.02        Plan:       Medicare Annual Wellness and Personalized Prevention Plan:     Fall Risk + Home Safety + Living Situation + Whisper Test + Depression Screen + CAGE + Cognitive Impairment Screen + ADL Screen + Timed Get Up and Go + Nutrition Screen + PAQ Screen + Health Risk Assessment all reviewed.          No data to display                  11/20/2023     9:30 AM 4/24/2023     1:00 PM 11/17/2022     8:40 AM   Fall Risk Assessment - Outpatient   Mobility Status Ambulatory Ambulatory Ambulatory   Number of falls 0 0 0   Identified as fall risk False False False                   Depression Screening  Over the past two weeks, has the patient felt down, depressed, or hopeless?: No  Over the past two weeks, has the patient felt little interest or pleasure in doing things?: No  Functional Ability/Safety Screening  Was the " patient's timed Up & Go test unsteady or longer than 30 seconds?: No  Does the patient need help with phone, transportation, shopping, preparing meals, housework, laundry, meds, or managing money?: No  Does the patient's home have rugs in the hallway, lack grab bars in the bathroom, lack handrails on the stairs or have poor lighting?: No  Have you noticed any hearing difficulties?: No  Cognitive Function (Assessed through direct observation with due consideration of information obtained by way of patient reports and/or concerns raised by family, friends, caretakers, or others)    Does the patient repeat questions/statements in the same day?: No  Does the patient have trouble remembering the date, year, and time?: No  Does the patient have difficulty managing finances?: No  Does the patient have a decreased sense of direction?: No         Alcohol/Tobacco Use - Stressed importance of smoking cessation and limiting alcohol intake.  CVD Risk Factors - Reviewed  Obesity/Physical Activity - Encouraged daily 30 minute physical activity x 5 days per week.    Opioid Screening: Patient medication list reviewed, patient is not taking prescription opioids. Patient is not using additional opioids than prescribed. Patient is at low risk of substance abuse based on this opioid use history.        Health Maintenance Due   Topic Date Due    Pneumococcal Vaccines (Age 65+) (1 - PCV) Never done    TETANUS VACCINE  Never done    Shingles Vaccine (1 of 2) Never done    RSV Vaccine (Age 60+ and Pregnant patients) (1 - 1-dose 60+ series) Never done    Influenza Vaccine (1) 09/01/2023    COVID-19 Vaccine (4 - 2023-24 season) 09/01/2023    Eye Exam - Recommend annually   Dental Exam - Recommend biannually  Vaccinations -   Immunization History   Administered Date(s) Administered    COVID-19 Vaccine 05/01/2021    COVID-19, MRNA, LN-S, PF (MODERNA FULL 0.5 ML DOSE) 02/02/2021, 03/02/2021    DTaP 09/17/2023    Hepatitis A, Adult 05/05/2017,  11/06/2017    Hepatitis B, Adult 05/05/2017, 06/02/2017, 08/25/2017    Influenza - Trivalent - PF (ADULT) 01/20/2022        Advance Care Planning     Date: 12/06/2023  ACP discussed with patient and form provided for patient to complete and return to office.           1. Routine general medical examination at a health care facility    2. Chronic kidney disease, stage 3a  -Back to baseline. Will continue to monitor.   3. RUPERTO (generalized anxiety disorder)  -Will try to switch from celexa to Lexapro. If patient does not tolerate, she was instructed to restart the celexa and contact the clinic.     Educated patient on the risks of serotonin based medications such as serotonin modulators and SSRIs/SNRIs.   Risks discussed include but were not limited to common side effects of the specific medications, risk for worsening symptoms of depression including development of suicidal thoughts or ideations, and serotonin syndrome.   Counseled patient on expected time course of treatment plan including potential benefits of medication not becoming noticeable until up to 6 weeks from start date.   Patient voiced understanding of the risks and plan.     Medication List with Changes/Refills   New Medications    ESCITALOPRAM OXALATE (LEXAPRO) 20 MG TABLET    Take 1 tablet (20 mg total) by mouth once daily.       Start Date: 11/20/2023End Date: 12/5/2023   Current Medications    ACIDOPHILUS-PECTIN, CITRUS 100 MILLION CELL-10 MG CAP    capsule       Start Date: --        End Date: --    ASPIRIN (ECOTRIN) 81 MG EC TABLET    Take 81 mg by mouth once daily.       Start Date: --        End Date: --    CHOLECALCIFEROL, VITAMIN D3, (VITAMIN D3) 50 MCG (2,000 UNIT) TAB    Take 30 tablets by mouth once daily.       Start Date: --        End Date: --    DOCUSATE SODIUM (COLACE) 100 MG CAPSULE    Take 100 mg by mouth 2 (two) times daily as needed for Constipation.       Start Date: 6/12/2023 End Date: --    EVOLOCUMAB (REPATHA SURECLICK) 140  MG/ML PNIJ    Repatha SureClick 140 mg/mL subcutaneous pen injector, [RxNorm: 1321324]       Start Date: 6/12/2023 End Date: --    FENOFIBRATE (TRICOR) 145 MG TABLET    Take 145 mg by mouth once daily.       Start Date: 11/10/2022End Date: --    FISH OIL-OMEGA-3 FATTY ACIDS 300-1,000 MG CAPSULE    Take 2 g by mouth once daily.       Start Date: --        End Date: --    OCALIVA 5 MG TAB    Take 1 tablet by mouth once daily.       Start Date: 11/10/2022End Date: --    PANTOPRAZOLE (PROTONIX) 40 MG TABLET    Take 40 mg by mouth once daily.       Start Date: --        End Date: --    PRALUENT PEN 75 MG/ML PNIJ    Inject 1 mL into the skin every 14 (fourteen) days.       Start Date: 11/6/2023 End Date: --    URSODIOL (ACTIGALL) 300 MG CAPSULE    Take 300 mg by mouth 2 (two) times daily.       Start Date: 11/10/2022End Date: --   Discontinued Medications    CITALOPRAM (CELEXA) 20 MG TABLET    TAKE ONE TABLET BY MOUTH EVERY DAY       Start Date: 4/3/2023  End Date: 11/20/2023          Follow up in about 6 months (around 5/20/2024) for Follow up anxiety. In addition to their scheduled follow up, the patient has also been instructed to follow up on as needed basis.     Provided patient with a 5-10 year written screening schedule and personal prevention plan. Recommendations were developed using the USPSTF age appropriate recommendations. Education, counseling, and referrals were provided as needed. After Visit Summary printed and given to patient which includes a list of additional screenings\tests needed.

## 2024-02-05 ENCOUNTER — OFFICE VISIT (OUTPATIENT)
Dept: FAMILY MEDICINE | Facility: CLINIC | Age: 69
End: 2024-02-05
Payer: MEDICARE

## 2024-02-05 VITALS
HEART RATE: 93 BPM | BODY MASS INDEX: 26.43 KG/M2 | WEIGHT: 158.63 LBS | TEMPERATURE: 98 F | RESPIRATION RATE: 20 BRPM | OXYGEN SATURATION: 98 % | HEIGHT: 65 IN | DIASTOLIC BLOOD PRESSURE: 86 MMHG | SYSTOLIC BLOOD PRESSURE: 136 MMHG

## 2024-02-05 DIAGNOSIS — N18.31 CHRONIC KIDNEY DISEASE, STAGE 3A: ICD-10-CM

## 2024-02-05 DIAGNOSIS — I10 PRIMARY HYPERTENSION: Primary | Chronic | ICD-10-CM

## 2024-02-05 PROCEDURE — 99214 OFFICE O/P EST MOD 30 MIN: CPT | Mod: ,,,

## 2024-02-05 RX ORDER — LISINOPRIL 2.5 MG/1
2.5 TABLET ORAL DAILY
Qty: 30 TABLET | Refills: 2 | Status: SHIPPED | OUTPATIENT
Start: 2024-02-05 | End: 2024-02-19 | Stop reason: SDUPTHER

## 2024-02-05 NOTE — PROGRESS NOTES
Patient ID: [id number]     Chief Complaint: Hypertension (Stated her symptoms started when she was switched to lexapro. She was recently switched back to celexa. Has brought in her readings)      HPI:     Mary Beth Henson is a 68 y.o. female here today for a problem visit. The patient reports that she is experiencing elevations in her blood pressure. She reports that Bps 130s/80s is not normal for her. Years ago she was put on Lisinopril 10 mg daily, but she finds that this drops her BP too low.     Past Medical History:   Diagnosis Date    Anxiety     GERD (gastroesophageal reflux disease)     H pylori ulcer 2017    Hyperlipidemia     Hypertension     Personal history of colonic polyps 2021    s/p polypectomy - Dr Davie Torrez    Primary biliary cholangitis         Past Surgical History:   Procedure Laterality Date     SECTION      COLONOSCOPY W/ BIOPSIES AND POLYPECTOMY  2021    Dr Davie Torrez    ESOPHAGOGASTRODUODENOSCOPY  2017    mucosa suggestive of Brasher's.     ESOPHAGOGASTRODUODENOSCOPY  2021    Dr Davie Torrez    EYE SURGERY  2023    FISTULA REPAIR      LIVER BIOPSY  2017    UPPER ENDOSCOPIC ULTRASOUND W/ FNA  2017    normal    UPPER GASTROINTESTINAL ENDOSCOPY          Social History     Socioeconomic History    Marital status:     Number of children: 1   Occupational History    Occupation: retired   Tobacco Use    Smoking status: Former    Smokeless tobacco: Never    Tobacco comments:     Quit years ago dont remember dates   Substance and Sexual Activity    Alcohol use: No    Drug use: Never    Sexual activity: Not Currently     Social Determinants of Health     Financial Resource Strain: Low Risk  (2023)    Overall Financial Resource Strain (CARDIA)     Difficulty of Paying Living Expenses: Not hard at all   Food Insecurity: No Food Insecurity (2023)    Hunger Vital Sign     Worried About Running Out of Food in the  Last Year: Never true     Ran Out of Food in the Last Year: Never true   Transportation Needs: No Transportation Needs (11/20/2023)    PRAPARE - Transportation     Lack of Transportation (Medical): No     Lack of Transportation (Non-Medical): No   Physical Activity: Sufficiently Active (11/20/2023)    Exercise Vital Sign     Days of Exercise per Week: 5 days     Minutes of Exercise per Session: 30 min   Stress: Stress Concern Present (11/20/2023)    [employer] [employer] [place] [employer] - Occupational Stress Questionnaire     Feeling of Stress : Rather much   Social Connections: Moderately Integrated (11/20/2023)    Social Connection and Isolation Panel [NHANES]     Frequency of Communication with Friends and Family: More than three times a week     Frequency of Social Gatherings with Friends and Family: Three times a week     Attends Judaism Services: 1 to 4 times per year     Active Member of Clubs or Organizations: No     Attends Club or Organization Meetings: 1 to 4 times per year     Marital Status:    Housing Stability: Low Risk  (11/20/2023)    Housing Stability Vital Sign     Unable to Pay for Housing in the Last Year: No     Number of Places Lived in the Last Year: 1     Unstable Housing in the Last Year: No        Current Outpatient Medications   Medication Instructions    aspirin (ECOTRIN) 81 mg, Oral, Daily    citalopram (CELEXA) 20 mg, Oral, Daily    docusate sodium (COLACE) 100 mg, Oral, 2 times daily PRN    evolocumab (REPATHA SURECLICK) 140 mg/mL PnIj Repatha SureClick 140 mg/mL subcutaneous pen injector, [RxNorm: 7442516]    fenofibrate (TRICOR) 145 mg, Oral, Daily    fish oil-omega-3 fatty acids 300-1,000 mg capsule 2 g, Oral, Daily    lisinopriL (PRINIVIL,ZESTRIL) 2.5 mg, Oral, Daily    OCALIVA 5 mg Tab 1 tablet, Oral, Daily    pantoprazole (PROTONIX) 40 mg, Oral, Daily    ursodioL (ACTIGALL) 300 mg, Oral, 2 times daily       Review of patient's allergies indicates:  No Known  "Allergies     Patient Care Team:  Keyshawn Florez DO as PCP - General (Family Medicine)  Davie Torrez MD as Consulting Physician (Gastroenterology)     Subjective:     Review of Systems    12 point review of systems conducted, negative except as stated in the history of present illness. See HPI for details.    Objective:     Visit Vitals  /86 (BP Location: Right arm, Patient Position: Sitting, BP Method: Large (Automatic))   Pulse 93   Temp 98.1 °F (36.7 °C)   Resp 20   Ht 5' 5" (1.651 m)   Wt 71.9 kg (158 lb 9.6 oz)   SpO2 98%   BMI 26.39 kg/m²       Physical Exam  Vitals and nursing note reviewed.   Constitutional:       General: She is not in acute distress.     Appearance: She is not ill-appearing.   HENT:      Head: Normocephalic and atraumatic.      Mouth/Throat:      Mouth: Mucous membranes are moist.      Pharynx: Oropharynx is clear.   Eyes:      General: No scleral icterus.     Extraocular Movements: Extraocular movements intact.      Conjunctiva/sclera: Conjunctivae normal.      Pupils: Pupils are equal, round, and reactive to light.   Neck:      Vascular: No carotid bruit.   Cardiovascular:      Rate and Rhythm: Normal rate and regular rhythm.      Heart sounds: No murmur heard.     No friction rub. No gallop.   Pulmonary:      Effort: Pulmonary effort is normal. No respiratory distress.      Breath sounds: Normal breath sounds. No wheezing, rhonchi or rales.   Abdominal:      General: Abdomen is flat. Bowel sounds are normal. There is no distension.      Palpations: Abdomen is soft. There is no mass.      Tenderness: There is no abdominal tenderness.   Musculoskeletal:         General: Normal range of motion.      Cervical back: Normal range of motion and neck supple.   Skin:     General: Skin is warm and dry.   Neurological:      General: No focal deficit present.      Mental Status: She is alert.   Psychiatric:         Mood and Affect: Mood normal.         Labs Reviewed: "     Chemistry:  Lab Results   Component Value Date     09/27/2023    K 5.2 (H) 09/27/2023    CHLORIDE 110 (H) 09/27/2023    BUN 33.0 (H) 09/27/2023    CREATININE 1.11 (H) 09/27/2023    EGFRNORACEVR 55 09/27/2023    GLUCOSE 100 09/27/2023    CALCIUM 9.8 09/27/2023    ALKPHOS 55 09/27/2023    LABPROT 7.5 09/27/2023    ALBUMIN 4.2 09/27/2023    BILIDIR 0.2 01/14/2022    IBILI 0.20 01/14/2022    AST 20 09/27/2023    ALT 21 09/27/2023    MG 2.00 01/14/2022    DMFQYFZE21JD 52.6 09/27/2023    TSH 2.292 04/24/2023        Lab Results   Component Value Date    HGBA1C 5.4 09/27/2023        Hematology:  Lab Results   Component Value Date    WBC 4.22 (L) 09/27/2023    HGB 13.0 09/27/2023    HCT 40.1 09/27/2023     09/27/2023       Lipid Panel:  Lab Results   Component Value Date    CHOL 159 09/27/2023    HDL 58 09/27/2023    LDL 85.00 09/27/2023    TRIG 79 09/27/2023    TOTALCHOLEST 3 09/27/2023      Assessment:       ICD-10-CM ICD-9-CM   1. Primary hypertension  I10 401.9   2. Chronic kidney disease, stage 3a  N18.31 585.3     Plan:     1. Primary hypertension  Assessment & Plan:  Decrease Lisinopril to 2.5 mg daily.   Educated patient that Lisinopril has renal protection effects.   RTC in one week with BP logs.   Low Sodium Diet (DASH Diet - Less than 2 grams of sodium per day).  Monitor blood pressure daily and log. Report consistent numbers greater than 140/90.  Maintain healthy weight with goal BMI <30. Exercise 30 minutes per day, 5 days per week.      Orders:  -     lisinopriL (PRINIVIL,ZESTRIL) 2.5 MG tablet; Take 1 tablet (2.5 mg total) by mouth once daily.  Dispense: 30 tablet; Refill: 2    2. Chronic kidney disease, stage 3a  Assessment & Plan:  Lab Results   Component Value Date    EGFRNORACEVR 55 09/27/2023    EGFRNORACEVR 45 09/06/2023     Recheck Renal function today.   Follow renoprotective measures including Renal Diet (reduce intake of nuts, peanut butter, milk, cheese, dried beans, peas) and Low  Sodium Diet (less than 2 grams per day).  Avoid NSAIDs (Aleve, Mobic, Celebrex, Ibuprofen, Advil, Toradol and Diclofenac). May take Tylenol as needed for headache/pain.  BP goal <130/80. LDL goal < 100.  Stay well hydrated. Avoid alcohol and soda. Limit tea and coffee.    Orders:  -     Comprehensive Metabolic Panel; Future; Expected date: 02/05/2024       Follow up in about 1 week (around 2/12/2024) for BP F/U . In addition to their scheduled follow up, the patient has also been instructed to follow up on as needed basis.     Future Appointments   Date Time Provider Department Center   2/12/2024  8:00 AM Olamide Hatch NP [facility]   5/21/2024  8:30 AM Keyshawn Florez DO [facility]   11/22/2024  9:00 AM Keyshawn Florez DO [facility] Ka'Ryn Franklin, NP

## 2024-02-06 NOTE — ASSESSMENT & PLAN NOTE
Decrease Lisinopril to 2.5 mg daily.   Educated patient that Lisinopril has renal protection effects.   RTC in one week with BP logs.   Low Sodium Diet (DASH Diet - Less than 2 grams of sodium per day).  Monitor blood pressure daily and log. Report consistent numbers greater than 140/90.  Maintain healthy weight with goal BMI <30. Exercise 30 minutes per day, 5 days per week.

## 2024-02-06 NOTE — ASSESSMENT & PLAN NOTE
Lab Results   Component Value Date    EGFRNORACEVR 55 09/27/2023    EGFRNORACEVR 45 09/06/2023     Recheck Renal function today.   Follow renoprotective measures including Renal Diet (reduce intake of nuts, peanut butter, milk, cheese, dried beans, peas) and Low Sodium Diet (less than 2 grams per day).  Avoid NSAIDs (Aleve, Mobic, Celebrex, Ibuprofen, Advil, Toradol and Diclofenac). May take Tylenol as needed for headache/pain.  BP goal <130/80. LDL goal < 100.  Stay well hydrated. Avoid alcohol and soda. Limit tea and coffee.

## 2024-02-08 ENCOUNTER — LAB VISIT (OUTPATIENT)
Dept: LAB | Facility: HOSPITAL | Age: 69
End: 2024-02-08
Payer: MEDICARE

## 2024-02-08 DIAGNOSIS — N18.31 CHRONIC KIDNEY DISEASE, STAGE 3A: ICD-10-CM

## 2024-02-08 LAB
ALBUMIN SERPL-MCNC: 4.1 G/DL (ref 3.4–4.8)
ALBUMIN/GLOB SERPL: 1.2 RATIO (ref 1.1–2)
ALP SERPL-CCNC: 57 UNIT/L (ref 40–150)
ALT SERPL-CCNC: 20 UNIT/L (ref 0–55)
AST SERPL-CCNC: 22 UNIT/L (ref 5–34)
BILIRUB SERPL-MCNC: 0.4 MG/DL
BUN SERPL-MCNC: 23 MG/DL (ref 9.8–20.1)
CALCIUM SERPL-MCNC: 10 MG/DL (ref 8.4–10.2)
CHLORIDE SERPL-SCNC: 109 MMOL/L (ref 98–107)
CO2 SERPL-SCNC: 25 MMOL/L (ref 23–31)
CREAT SERPL-MCNC: 1.04 MG/DL (ref 0.55–1.02)
GFR SERPLBLD CREATININE-BSD FMLA CKD-EPI: 59 MLS/MIN/1.73/M2
GLOBULIN SER-MCNC: 3.3 GM/DL (ref 2.4–3.5)
GLUCOSE SERPL-MCNC: 99 MG/DL (ref 82–115)
POTASSIUM SERPL-SCNC: 5.4 MMOL/L (ref 3.5–5.1)
PROT SERPL-MCNC: 7.4 GM/DL (ref 5.8–7.6)
SODIUM SERPL-SCNC: 143 MMOL/L (ref 136–145)

## 2024-02-08 PROCEDURE — 36415 COLL VENOUS BLD VENIPUNCTURE: CPT

## 2024-02-08 PROCEDURE — 80053 COMPREHEN METABOLIC PANEL: CPT

## 2024-02-12 ENCOUNTER — OFFICE VISIT (OUTPATIENT)
Dept: FAMILY MEDICINE | Facility: CLINIC | Age: 69
End: 2024-02-12
Payer: MEDICARE

## 2024-02-12 ENCOUNTER — HOSPITAL ENCOUNTER (EMERGENCY)
Facility: HOSPITAL | Age: 69
Discharge: HOME | End: 2024-02-12
Attending: STUDENT IN AN ORGANIZED HEALTH CARE EDUCATION/TRAINING PROGRAM
Payer: MEDICARE

## 2024-02-12 ENCOUNTER — TELEPHONE (OUTPATIENT)
Dept: FAMILY MEDICINE | Facility: CLINIC | Age: 69
End: 2024-02-12
Payer: MEDICARE

## 2024-02-12 VITALS
SYSTOLIC BLOOD PRESSURE: 155 MMHG | BODY MASS INDEX: 28.17 KG/M2 | DIASTOLIC BLOOD PRESSURE: 91 MMHG | OXYGEN SATURATION: 99 % | TEMPERATURE: 98 F | RESPIRATION RATE: 18 BRPM | WEIGHT: 165 LBS | HEART RATE: 72 BPM | HEIGHT: 64 IN

## 2024-02-12 VITALS
RESPIRATION RATE: 18 BRPM | WEIGHT: 160.81 LBS | HEART RATE: 60 BPM | OXYGEN SATURATION: 99 % | BODY MASS INDEX: 26.79 KG/M2 | TEMPERATURE: 97 F | HEIGHT: 65 IN | SYSTOLIC BLOOD PRESSURE: 159 MMHG | DIASTOLIC BLOOD PRESSURE: 89 MMHG

## 2024-02-12 DIAGNOSIS — E87.5 SERUM POTASSIUM ELEVATED: ICD-10-CM

## 2024-02-12 DIAGNOSIS — I10 HYPERTENSION: Primary | ICD-10-CM

## 2024-02-12 DIAGNOSIS — F41.1 GAD (GENERALIZED ANXIETY DISORDER): ICD-10-CM

## 2024-02-12 DIAGNOSIS — I10 PRIMARY HYPERTENSION: Primary | Chronic | ICD-10-CM

## 2024-02-12 DIAGNOSIS — N18.31 CHRONIC KIDNEY DISEASE, STAGE 3A: ICD-10-CM

## 2024-02-12 LAB
ANION GAP SERPL CALC-SCNC: 11 MEQ/L
BASOPHILS # BLD AUTO: 0.03 X10(3)/MCL
BASOPHILS NFR BLD AUTO: 0.6 %
BUN SERPL-MCNC: 22 MG/DL (ref 9.8–20.1)
CALCIUM SERPL-MCNC: 10.5 MG/DL (ref 8.4–10.2)
CHLORIDE SERPL-SCNC: 108 MMOL/L (ref 98–107)
CO2 SERPL-SCNC: 24 MMOL/L (ref 23–31)
CREAT SERPL-MCNC: 1.07 MG/DL (ref 0.55–1.02)
CREAT/UREA NIT SERPL: 21
EOSINOPHIL # BLD AUTO: 0.05 X10(3)/MCL (ref 0–0.9)
EOSINOPHIL NFR BLD AUTO: 1.1 %
ERYTHROCYTE [DISTWIDTH] IN BLOOD BY AUTOMATED COUNT: 13.3 % (ref 11.5–17)
GFR SERPLBLD CREATININE-BSD FMLA CKD-EPI: 57 MLS/MIN/1.73/M2
GLUCOSE SERPL-MCNC: 96 MG/DL (ref 82–115)
HCT VFR BLD AUTO: 43.1 % (ref 37–47)
HGB BLD-MCNC: 14.1 G/DL (ref 12–16)
IMM GRANULOCYTES # BLD AUTO: 0.01 X10(3)/MCL (ref 0–0.04)
IMM GRANULOCYTES NFR BLD AUTO: 0.2 %
LYMPHOCYTES # BLD AUTO: 1.71 X10(3)/MCL (ref 0.6–4.6)
LYMPHOCYTES NFR BLD AUTO: 36.9 %
MCH RBC QN AUTO: 28.8 PG (ref 27–31)
MCHC RBC AUTO-ENTMCNC: 32.7 G/DL (ref 33–36)
MCV RBC AUTO: 88.1 FL (ref 80–94)
MONOCYTES # BLD AUTO: 0.32 X10(3)/MCL (ref 0.1–1.3)
MONOCYTES NFR BLD AUTO: 6.9 %
NEUTROPHILS # BLD AUTO: 2.52 X10(3)/MCL (ref 2.1–9.2)
NEUTROPHILS NFR BLD AUTO: 54.3 %
NRBC BLD AUTO-RTO: 0 %
OHS QRS DURATION: 74 MS
OHS QTC CALCULATION: 430 MS
PLATELET # BLD AUTO: 298 X10(3)/MCL (ref 130–400)
PMV BLD AUTO: 11.3 FL (ref 7.4–10.4)
POTASSIUM SERPL-SCNC: 4.7 MMOL/L (ref 3.5–5.1)
RBC # BLD AUTO: 4.89 X10(6)/MCL (ref 4.2–5.4)
SODIUM SERPL-SCNC: 143 MMOL/L (ref 136–145)
TROPONIN I SERPL-MCNC: <0.01 NG/ML (ref 0–0.04)
WBC # SPEC AUTO: 4.64 X10(3)/MCL (ref 4.5–11.5)

## 2024-02-12 PROCEDURE — 80048 BASIC METABOLIC PNL TOTAL CA: CPT | Performed by: STUDENT IN AN ORGANIZED HEALTH CARE EDUCATION/TRAINING PROGRAM

## 2024-02-12 PROCEDURE — 93005 ELECTROCARDIOGRAM TRACING: CPT

## 2024-02-12 PROCEDURE — 25000003 PHARM REV CODE 250: Performed by: STUDENT IN AN ORGANIZED HEALTH CARE EDUCATION/TRAINING PROGRAM

## 2024-02-12 PROCEDURE — 99214 OFFICE O/P EST MOD 30 MIN: CPT | Mod: ,,,

## 2024-02-12 PROCEDURE — 63600175 PHARM REV CODE 636 W HCPCS: Performed by: STUDENT IN AN ORGANIZED HEALTH CARE EDUCATION/TRAINING PROGRAM

## 2024-02-12 PROCEDURE — 93010 ELECTROCARDIOGRAM REPORT: CPT | Mod: ,,, | Performed by: INTERNAL MEDICINE

## 2024-02-12 PROCEDURE — 84484 ASSAY OF TROPONIN QUANT: CPT | Performed by: STUDENT IN AN ORGANIZED HEALTH CARE EDUCATION/TRAINING PROGRAM

## 2024-02-12 PROCEDURE — 99284 EMERGENCY DEPT VISIT MOD MDM: CPT | Mod: 25

## 2024-02-12 PROCEDURE — 85025 COMPLETE CBC W/AUTO DIFF WBC: CPT | Performed by: STUDENT IN AN ORGANIZED HEALTH CARE EDUCATION/TRAINING PROGRAM

## 2024-02-12 PROCEDURE — 96374 THER/PROPH/DIAG INJ IV PUSH: CPT

## 2024-02-12 RX ORDER — AMLODIPINE BESYLATE 5 MG/1
5 TABLET ORAL DAILY
Qty: 30 TABLET | Refills: 0 | Status: SHIPPED | OUTPATIENT
Start: 2024-02-12 | End: 2024-02-19

## 2024-02-12 RX ORDER — AMLODIPINE BESYLATE 5 MG/1
5 TABLET ORAL
Status: COMPLETED | OUTPATIENT
Start: 2024-02-12 | End: 2024-02-12

## 2024-02-12 RX ORDER — LABETALOL HYDROCHLORIDE 5 MG/ML
10 INJECTION, SOLUTION INTRAVENOUS
Status: DISCONTINUED | OUTPATIENT
Start: 2024-02-12 | End: 2024-02-12 | Stop reason: HOSPADM

## 2024-02-12 RX ORDER — BUSPIRONE HYDROCHLORIDE 7.5 MG/1
7.5 TABLET ORAL 2 TIMES DAILY
Qty: 60 TABLET | Refills: 0 | Status: SHIPPED | OUTPATIENT
Start: 2024-02-12 | End: 2024-02-12

## 2024-02-12 RX ADMIN — AMLODIPINE BESYLATE 5 MG: 5 TABLET ORAL at 11:02

## 2024-02-12 RX ADMIN — LORAZEPAM 1 MG: 2 INJECTION INTRAMUSCULAR; INTRAVENOUS at 10:02

## 2024-02-12 NOTE — ASSESSMENT & PLAN NOTE
BP has improved.   Continue Lisinopril 2.5 mg daily.   Low Sodium Diet (DASH Diet - Less than 2 grams of sodium per day).  Monitor blood pressure daily and log. Report consistent numbers greater than 140/90.  Maintain healthy weight with goal BMI <30. Exercise 30 minutes per day, 5 days per week.    BP slightly elevated today in clinic. Advised the patient to closely monitor BP at home. Please call office is BP is consistently elevated.

## 2024-02-12 NOTE — ED NOTES
Pt c/o HTN and headache for the past week. Pt was at her PCP this morning and she told them about her HTN. Per pt she was advised to take her bp medication and go home and watch bp. If it did not go down to take another dose of her BP medication. Pt states she did take two of her bp meds and came to ED when it did not go down. Pt notes that she has been under a lot of stress lately.

## 2024-02-12 NOTE — TELEPHONE ENCOUNTER
----- Message from Nimo Veronica sent at 2/12/2024  1:03 PM CST -----  Regarding: call back  After Ms Clinton left here her blood pressure did not go down so she decided to go to the ER [place] her BP was 184/101 she needs to talk to a nurse about her meds please give her a call back [phone]      thanks

## 2024-02-12 NOTE — PROGRESS NOTES
Patient ID: [id number]     Chief Complaint: Follow-up and Hypertension      HPI:     Mary Beth Henson is a 68 y.o. female here today for a HTN follow up. Overall, the patient's blood pressures have improved. BP is elevated today in clinic. She reports that she recently is under a lot of stress in her personal life.     Past Medical History:   Diagnosis Date    Anxiety     GERD (gastroesophageal reflux disease)     H pylori ulcer 2017    Hyperlipidemia     Hypertension     Personal history of colonic polyps 2021    s/p polypectomy - Dr Davie Torrez    Primary biliary cholangitis         Past Surgical History:   Procedure Laterality Date     SECTION      COLONOSCOPY W/ BIOPSIES AND POLYPECTOMY  2021    Dr Davie Torrez    ESOPHAGOGASTRODUODENOSCOPY  2017    mucosa suggestive of Brasher's.     ESOPHAGOGASTRODUODENOSCOPY  2021    Dr Davie Torrez    EYE SURGERY  2023    FISTULA REPAIR      LIVER BIOPSY  2017    UPPER ENDOSCOPIC ULTRASOUND W/ FNA  2017    normal    UPPER GASTROINTESTINAL ENDOSCOPY          Social History     Socioeconomic History    Marital status:     Number of children: 1   Occupational History    Occupation: retired   Tobacco Use    Smoking status: Former    Smokeless tobacco: Never    Tobacco comments:     Quit years ago dont remember dates   Substance and Sexual Activity    Alcohol use: No    Drug use: Never    Sexual activity: Not Currently     Social Determinants of Health     Financial Resource Strain: Low Risk  (2023)    Overall Financial Resource Strain (CARDIA)     Difficulty of Paying Living Expenses: Not hard at all   Food Insecurity: No Food Insecurity (2023)    Hunger Vital Sign     Worried About Running Out of Food in the Last Year: Never true     Ran Out of Food in the Last Year: Never true   Transportation Needs: No Transportation Needs (2023)    PRAPARE - Transportation     Lack of Transportation  (Medical): No     Lack of Transportation (Non-Medical): No   Physical Activity: Sufficiently Active (11/20/2023)    Exercise Vital Sign     Days of Exercise per Week: 5 days     Minutes of Exercise per Session: 30 min   Stress: Stress Concern Present (11/20/2023)    [employer] [place] [employer] - Occupational Stress Questionnaire     Feeling of Stress : Rather much   Social Connections: Moderately Integrated (11/20/2023)    Social Connection and Isolation Panel [NHANES]     Frequency of Communication with Friends and Family: More than three times a week     Frequency of Social Gatherings with Friends and Family: Three times a week     Attends Gnosticism Services: 1 to 4 times per year     Active Member of Clubs or Organizations: No     Attends Club or Organization Meetings: 1 to 4 times per year     Marital Status:    Housing Stability: Low Risk  (11/20/2023)    Housing Stability Vital Sign     Unable to Pay for Housing in the Last Year: No     Number of Places Lived in the Last Year: 1     Unstable Housing in the Last Year: No        Current Outpatient Medications   Medication Instructions    aspirin (ECOTRIN) 81 mg, Oral, Daily    citalopram (CELEXA) 20 mg, Oral, Daily    docusate sodium (COLACE) 100 mg, Oral, 2 times daily PRN    evolocumab (REPATHA SURECLICK) 140 mg/mL PnIj 1 mL, Subcutaneous, Every 14 days    fenofibrate (TRICOR) 145 mg, Oral, Daily    fish oil-omega-3 fatty acids 300-1,000 mg capsule 2 g, Oral, Daily    lisinopriL (PRINIVIL,ZESTRIL) 2.5 mg, Oral, Daily    OCALIVA 5 mg Tab 1 tablet, Oral, Daily    pantoprazole (PROTONIX) 40 mg, Oral, Daily    ursodioL (ACTIGALL) 300 mg, Oral, 2 times daily       Review of patient's allergies indicates:  No Known Allergies     Patient Care Team:  Keyshawn Florez DO as PCP - General (Family Medicine)  Davie Torerz MD as Consulting Physician (Gastroenterology)     Subjective:     Review of Systems    12 point review of systems  "conducted, negative except as stated in the history of present illness. See HPI for details.    Objective:     Visit Vitals  BP (!) 159/89   Pulse 60   Temp 97.1 °F (36.2 °C) (Temporal)   Resp 18   Ht 5' 4.96" (1.65 m)   Wt 72.9 kg (160 lb 12.8 oz)   SpO2 99%   BMI 26.79 kg/m²       Physical Exam  Constitutional:       General: She is not in acute distress.     Appearance: Normal appearance. She is well-groomed. She is not ill-appearing.   HENT:      Head: Normocephalic and atraumatic.      Nose: Nose normal.      Mouth/Throat:      Mouth: Mucous membranes are moist.      Pharynx: Oropharynx is clear.   Eyes:      General: No scleral icterus.     Extraocular Movements: Extraocular movements intact.      Conjunctiva/sclera: Conjunctivae normal.      Pupils: Pupils are equal, round, and reactive to light.   Cardiovascular:      Rate and Rhythm: Normal rate and regular rhythm.   Pulmonary:      Effort: Pulmonary effort is normal.      Breath sounds: Normal breath sounds.   Abdominal:      General: Abdomen is flat. Bowel sounds are normal.      Palpations: Abdomen is soft.   Musculoskeletal:         General: Normal range of motion.      Cervical back: Normal range of motion and neck supple.   Skin:     General: Skin is warm and dry.   Neurological:      General: No focal deficit present.      Mental Status: She is alert and oriented to person, place, and time. Mental status is at baseline.   Psychiatric:         Attention and Perception: Attention and perception normal. She does not perceive auditory or visual hallucinations.         Mood and Affect: Mood is anxious. Affect is tearful.         Behavior: Behavior normal. Behavior is not agitated, aggressive or withdrawn. Behavior is cooperative.         Thought Content: Thought content normal. Thought content is not paranoid or delusional. Thought content does not include homicidal or suicidal ideation.         Cognition and Memory: Cognition normal.         Judgment: " Judgment normal.         Labs Reviewed:     Chemistry:  Lab Results   Component Value Date     02/08/2024    K 5.4 (H) 02/08/2024    CHLORIDE 109 (H) 02/08/2024    BUN 23.0 (H) 02/08/2024    CREATININE 1.04 (H) 02/08/2024    EGFRNORACEVR 59 02/08/2024    GLUCOSE 99 02/08/2024    CALCIUM 10.0 02/08/2024    ALKPHOS 57 02/08/2024    LABPROT 7.4 02/08/2024    ALBUMIN 4.1 02/08/2024    BILIDIR 0.2 01/14/2022    IBILI 0.20 01/14/2022    AST 22 02/08/2024    ALT 20 02/08/2024    MG 2.00 01/14/2022    EORAHDDN10IG 52.6 09/27/2023    TSH 2.292 04/24/2023        Lab Results   Component Value Date    HGBA1C 5.4 09/27/2023        Hematology:  Lab Results   Component Value Date    WBC 4.22 (L) 09/27/2023    HGB 13.0 09/27/2023    HCT 40.1 09/27/2023     09/27/2023       Lipid Panel:  Lab Results   Component Value Date    CHOL 159 09/27/2023    HDL 58 09/27/2023    LDL 85.00 09/27/2023    TRIG 79 09/27/2023    TOTALCHOLEST 3 09/27/2023          Assessment:       ICD-10-CM ICD-9-CM   1. Primary hypertension  I10 401.9   2. Chronic kidney disease, stage 3a  N18.31 585.3   3. Serum potassium elevated  E87.5 276.7   4. RUPERTO (generalized anxiety disorder)  F41.1 300.02        Plan:     1. Primary hypertension  Assessment & Plan:  BP has improved.   Continue Lisinopril 2.5 mg daily.   Low Sodium Diet (DASH Diet - Less than 2 grams of sodium per day).  Monitor blood pressure daily and log. Report consistent numbers greater than 140/90.  Maintain healthy weight with goal BMI <30. Exercise 30 minutes per day, 5 days per week.    BP slightly elevated today in clinic. Advised the patient to closely monitor BP at home. Please call office is BP is consistently elevated.       2. Chronic kidney disease, stage 3a  Assessment & Plan:  Kidney function is improving.   Increase water intake.     Recheck in two weeks along with potassium.       3. Serum potassium elevated  -     Comprehensive Metabolic Panel; Future; Expected date:  02/26/2024    4. RUPERTO (generalized anxiety disorder)  Assessment & Plan:  Patient is under a great amount of stress at home.  She is taking Celexa 20 mg daily and has taking Hydroxyzine previously. I advised the patient to notify PCP if anxiety is not well controlled through current prescription and coping mechanisms.          Follow up for Keep Scheduled Appointment.. In addition to their scheduled follow up, the patient has also been instructed to follow up on as needed basis.     Future Appointments   Date Time Provider Department Center   5/21/2024  8:30 AM Keyshawn Florez DO [facility]   11/22/2024  9:00 AM Keyshawn Florez DO [facility] Ka'Ryn Franklin, NP

## 2024-02-12 NOTE — ASSESSMENT & PLAN NOTE
Patient is under a great amount of stress at home.  She is taking Celexa 20 mg daily and has taking Hydroxyzine previously. I advised the patient to notify PCP if anxiety is not well controlled through current prescription and coping mechanisms.

## 2024-02-12 NOTE — ED PROVIDER NOTES
"Encounter Date: 2024       History     Chief Complaint   Patient presents with    Hypertension     High blood pressure at home, denies any vision changes but has been having headache for last week and increased stress lately.     Patient is a 68-year-old white female with a past medical history of PBC, CKD, HTN and self reports "white coat syndrome" who presented to the ER today due to elevated blood pressure readings.  She states she has been under "a lot of stress with a my grandchild. "Patient feels that this is the cause of her elevated blood pressure readings.  She denies any vision changes, focal deficits, diplopia, dysarthria, dysphagia, changes in sensation.  She denies any chest pain, shortness of breath.  She states she was compliant with her antihypertensive of lisinopril 2.5.  Denies any fever, chills, cough, congestion, chest pain, shortness of breath, abdominal pain, back pain.      Review of patient's allergies indicates:  No Known Allergies  Past Medical History:   Diagnosis Date    Anxiety     GERD (gastroesophageal reflux disease)     H pylori ulcer 2017    Hyperlipidemia     Hypertension     Personal history of colonic polyps 2021    s/p polypectomy - Dr Davie Torrez    Primary biliary cholangitis      Past Surgical History:   Procedure Laterality Date     SECTION      COLONOSCOPY W/ BIOPSIES AND POLYPECTOMY  2021    Dr Davie Torrez    ESOPHAGOGASTRODUODENOSCOPY  2017    mucosa suggestive of Brasher's.     ESOPHAGOGASTRODUODENOSCOPY  2021    Dr Davie Torrez    EYE SURGERY  2023    FISTULA REPAIR      LIVER BIOPSY  2017    UPPER ENDOSCOPIC ULTRASOUND W/ FNA  2017    normal    UPPER GASTROINTESTINAL ENDOSCOPY       Family History   Problem Relation Age of Onset    Breast cancer Mother     Hypertension Mother     Heart disease Mother     Heart disease Father     Hypertension Father     Arthritis Father     Colon cancer Maternal " Aunt      Social History     Tobacco Use    Smoking status: Former    Smokeless tobacco: Never    Tobacco comments:     Quit years ago dont remember dates   Substance Use Topics    Alcohol use: No    Drug use: Never     Review of Systems   Constitutional:  Negative for chills, fatigue and fever.   HENT:  Negative for congestion, sore throat and trouble swallowing.    Eyes:  Negative for pain and visual disturbance.   Respiratory:  Negative for cough, shortness of breath and wheezing.    Cardiovascular:  Negative for chest pain and palpitations.   Gastrointestinal:  Negative for abdominal pain, blood in stool, constipation, diarrhea, nausea and vomiting.   Genitourinary:  Negative for dysuria and hematuria.   Musculoskeletal:  Negative for back pain and myalgias.   Skin:  Negative for rash and wound.   Neurological:  Negative for seizures, syncope and headaches.   Psychiatric/Behavioral:  Negative for confusion. The patient is nervous/anxious.        Physical Exam     Initial Vitals [02/12/24 1015]   BP Pulse Resp Temp SpO2   (!) 214/143 90 18 97.8 °F (36.6 °C) 99 %      MAP       --         Physical Exam    Nursing note and vitals reviewed.  Constitutional: She appears well-developed and well-nourished. She is not diaphoretic. No distress.   HENT:   Head: Normocephalic.   Right Ear: External ear normal.   Left Ear: External ear normal.   Nose: Nose normal.   Eyes: Conjunctivae and EOM are normal. Right eye exhibits no discharge. Left eye exhibits no discharge. No scleral icterus.   Neck:   Normal range of motion.  Cardiovascular:  Normal rate, regular rhythm and normal heart sounds.     Exam reveals no gallop and no friction rub.       No murmur heard.  Pulmonary/Chest: Breath sounds normal. No stridor. No respiratory distress. She has no wheezes. She has no rhonchi. She has no rales.   Abdominal: Abdomen is soft. She exhibits no distension. There is no abdominal tenderness. There is no rebound and no guarding.    Musculoskeletal:         General: Normal range of motion.      Cervical back: Normal range of motion.     Neurological: She is alert and oriented to person, place, and time. She has normal strength. No cranial nerve deficit or sensory deficit. GCS score is 15. GCS eye subscore is 4. GCS verbal subscore is 5. GCS motor subscore is 6.   Patient ambulated to the room with a stable gait GCS of 15.  CN II-XII intact bilaterally, PERRLA, EOMI, AO, no facial asymmetry noted, no abnormalities of vision loss or peripheral vision loss, strength 5/5 x 4 extremities, sensation intact throughout, no focal neurological deficits noted on exam.  Negative Pronator drift bilaterally.       Skin: Skin is warm. No rash noted. No erythema.   Psychiatric: She has a normal mood and affect. Her behavior is normal.         ED Course   Procedures  Labs Reviewed   BASIC METABOLIC PANEL - Abnormal; Notable for the following components:       Result Value    Chloride 108 (*)     Blood Urea Nitrogen 22.0 (*)     Creatinine 1.07 (*)     Calcium Level Total 10.5 (*)     All other components within normal limits   CBC WITH DIFFERENTIAL - Abnormal; Notable for the following components:    MCHC 32.7 (*)     MPV 11.3 (*)     All other components within normal limits   TROPONIN I - Normal   CBC W/ AUTO DIFFERENTIAL    Narrative:     The following orders were created for panel order CBC Auto Differential.  Procedure                               Abnormality         Status                     ---------                               -----------         ------                     CBC with Differential[[id number]]       Abnormal            Final result                 Please view results for these tests on the individual orders.     EKG Readings: (Independently Interpreted)   Initial Reading: No STEMI. Rhythm: Normal Sinus Rhythm. Heart Rate: 74. Ectopy: No Ectopy. Conduction: Normal. ST Segments: Normal ST Segments. T Waves: Normal. Axis: Normal.     ECG  Results              EKG 12-lead (In process)        Collection Time Result Time QRS Duration OHS QTC Calculation    02/12/24 10:42:36 02/12/24 11:09:43 74 430                     In process by Interface, Lab In [facility] (02/12/24 11:09:53)                   Narrative:    Test Reason : I10,    Vent. Rate : 074 BPM     Atrial Rate : 074 BPM     P-R Int : 080 ms          QRS Dur : 074 ms      QT Int : 388 ms       P-R-T Axes : 002 017 009 degrees     QTc Int : 430 ms    Program found technically poor ECG  Sinus rhythm with short MO  Junctional ST depression, probably normal  Borderline Abnormal ECG  No previous ECGs available    Referred By: [email or web address]   SELF           Confirmed By:                                   Imaging Results    None          Medications   labetaloL injection 10 mg (has no administration in time range)   amLODIPine tablet 5 mg (has no administration in time range)   LORazepam (ATIVAN) injection 1 mg (1 mg Intravenous Given 2/12/24 1045)     Medical Decision Making  Differentials: Hypertensive emergency, hypertensive urgency, uncontrolled hypertension, white coat hypertension, CVA/TIA   68-year-old well-appearing female with a normal neurological exam presents for elevated blood pressure readings.  Complete neurologic exam was performed showing no abnormalities.  EKG shows no ischemic changes and troponin negative.  Basic lab work shows a creatinine that is better than her recent baseline and potassium is now normalized.  No medications or antihypertensives were given and blood pressure improved from extreme levels down to 160/90.  She remained asymptomatic.  I did however noticed that every time I go into the patient's room her blood pressure would increase by 20 systolic and decreased on leaving the room.  Patient was noted to be extremely anxious in the room and voicing being stressed and subsequently received 1 mg of Ativan with resolution of the symptoms.  She does however have a ride home  with a friend at bedside.  I discussed at length with the patient treating blood pressure accordingly and after long discussion we decided on starting her on amlodipine 5 mg daily with close follow up with the PCP.  Strong ER return precautions were discussed, follow up with PCP is recommended and all questions were answered in layman's terms.    Amount and/or Complexity of Data Reviewed  Labs: ordered. Decision-making details documented in ED Course.  ECG/medicine tests: ordered and independent interpretation performed. Decision-making details documented in ED Course.    Risk  Prescription drug management.                                      Clinical Impression:  Final diagnoses:  [I10] Hypertension (Primary)          ED Disposition Condition    Discharge Stable          ED Prescriptions       Medication Sig Dispense Start Date End Date Auth. Provider    amLODIPine (NORVASC) 5 MG tablet Take 1 tablet (5 mg total) by mouth once daily. 30 tablet 2/12/2024 2/11/2025 Fredis Ring MD          Follow-up Information       Follow up With Specialties Details Why Contact Info    [facility] Emergency Medicine  If symptoms worsen [address] [place] [id number] [phone]    Keyshawn Florez, DO Family Medicine Schedule an appointment as soon as possible for a visit   [address] [facility] [id number]  [phone]               Fredis Ring MD  02/12/24 0624

## 2024-02-12 NOTE — ASSESSMENT & PLAN NOTE
Kidney function is improving.   Increase water intake.     Recheck in two weeks along with potassium.

## 2024-02-14 DIAGNOSIS — F41.1 GAD (GENERALIZED ANXIETY DISORDER): Primary | ICD-10-CM

## 2024-02-14 RX ORDER — HYDROXYZINE HYDROCHLORIDE 25 MG/1
25 TABLET, FILM COATED ORAL 3 TIMES DAILY PRN
Qty: 90 TABLET | Refills: 0 | Status: SHIPPED | OUTPATIENT
Start: 2024-02-14 | End: 2024-02-19

## 2024-02-15 RX ORDER — ALPRAZOLAM 0.25 MG/1
0.25 TABLET ORAL 2 TIMES DAILY PRN
Qty: 60 TABLET | Refills: 0 | Status: SHIPPED | OUTPATIENT
Start: 2024-02-15 | End: 2024-02-19

## 2024-02-15 NOTE — TELEPHONE ENCOUNTER
----- Message from Nimo Veronica sent at 2/15/2024 10:16 AM CST -----  Regarding: BP  Ms Mary Beth called this morning stating her BP is still running high one was 136/85 other was 155/94 that she can remember she said it does go up into the 100's at times please give her a call back to discuss [phone]      Thanks

## 2024-02-15 NOTE — TELEPHONE ENCOUNTER
Informed patient xanax 0.25 will be sent in bid and to only take it twice if needed to take it once daily at night due to drowsiness. Also instructed her to stop her hydroxyzine when taking the xanax. She verbalized understanding.

## 2024-02-15 NOTE — TELEPHONE ENCOUNTER
----- Message from Nimo Veronica sent at 2/15/2024 10:16 AM CST -----  Regarding: BP  Ms Mary Beth called this morning stating her BP is still running high one was 136/85 other was 155/94 that she can remember she said it does go up into the 100's at times please give her a call back to discuss [phone]      Thanks

## 2024-02-19 ENCOUNTER — OFFICE VISIT (OUTPATIENT)
Dept: FAMILY MEDICINE | Facility: CLINIC | Age: 69
End: 2024-02-19
Payer: MEDICARE

## 2024-02-19 VITALS
RESPIRATION RATE: 20 BRPM | SYSTOLIC BLOOD PRESSURE: 140 MMHG | WEIGHT: 156 LBS | OXYGEN SATURATION: 99 % | DIASTOLIC BLOOD PRESSURE: 92 MMHG | BODY MASS INDEX: 26.63 KG/M2 | HEIGHT: 64 IN | TEMPERATURE: 98 F | HEART RATE: 74 BPM

## 2024-02-19 DIAGNOSIS — F41.1 GAD (GENERALIZED ANXIETY DISORDER): Primary | ICD-10-CM

## 2024-02-19 DIAGNOSIS — I10 PRIMARY HYPERTENSION: Chronic | ICD-10-CM

## 2024-02-19 PROCEDURE — 99214 OFFICE O/P EST MOD 30 MIN: CPT | Mod: ,,,

## 2024-02-19 RX ORDER — CITALOPRAM 20 MG/1
40 TABLET, FILM COATED ORAL DAILY
Qty: 90 TABLET | Refills: 3 | Status: SHIPPED | OUTPATIENT
Start: 2024-02-19

## 2024-02-19 RX ORDER — LISINOPRIL 2.5 MG/1
2.5 TABLET ORAL 2 TIMES DAILY
Qty: 60 TABLET | Refills: 2 | Status: SHIPPED | OUTPATIENT
Start: 2024-02-19 | End: 2024-03-19

## 2024-02-19 RX ORDER — ALPRAZOLAM 0.25 MG/1
0.25 TABLET ORAL DAILY PRN
Qty: 30 TABLET | Refills: 0 | Status: SHIPPED | OUTPATIENT
Start: 2024-03-13 | End: 2024-03-19

## 2024-02-19 NOTE — PROGRESS NOTES
Patient ID: [id number]     Chief Complaint: Anxiety (Taking two xanax twice daily), Hypertension (Ramachandran brought in bp reading), and Headache (When bp is elevated)      HPI:     Mary Beth Henson is a 68 y.o. female here today for a follow up. She is still experiencing high stress levels at home. The patient reports that BP has been elevated at home. When  BP is elevated, she has a headache. She is taking her blood pressure every hour at home. When she takes her blood pressure, her anxiety increases. She reports that she has been taking 0.5 mg Xanax BID instead of 0.25 mg BID. She did not notify the office of these changes. Denies any chest pain, SOB, or dyspnea. Her grandson is here today for the visit.   Past Medical History:   Diagnosis Date    Anxiety     GERD (gastroesophageal reflux disease)     H pylori ulcer 2017    Hyperlipidemia     Hypertension     Personal history of colonic polyps 2021    s/p polypectomy - Dr Davie Torrez    Primary biliary cholangitis         Past Surgical History:   Procedure Laterality Date     SECTION      COLONOSCOPY W/ BIOPSIES AND POLYPECTOMY  2021    Dr Davie Torrez    ESOPHAGOGASTRODUODENOSCOPY  2017    mucosa suggestive of Brasher's.     ESOPHAGOGASTRODUODENOSCOPY  2021    Dr Davie Torrez    EYE SURGERY  2023    FISTULA REPAIR      LIVER BIOPSY  2017    UPPER ENDOSCOPIC ULTRASOUND W/ FNA  2017    normal    UPPER GASTROINTESTINAL ENDOSCOPY          Social History     Socioeconomic History    Marital status:     Number of children: 1   Occupational History    Occupation: retired   Tobacco Use    Smoking status: Former    Smokeless tobacco: Never    Tobacco comments:     Quit years ago dont remember dates   Substance and Sexual Activity    Alcohol use: No    Drug use: Never    Sexual activity: Not Currently     Social Determinants of Health     Financial Resource Strain: Low Risk  (2023)    Overall  Financial Resource Strain (CARDIA)     Difficulty of Paying Living Expenses: Not hard at all   Food Insecurity: No Food Insecurity (11/20/2023)    Hunger Vital Sign     Worried About Running Out of Food in the Last Year: Never true     Ran Out of Food in the Last Year: Never true   Transportation Needs: No Transportation Needs (11/20/2023)    PRAPARE - Transportation     Lack of Transportation (Medical): No     Lack of Transportation (Non-Medical): No   Physical Activity: Sufficiently Active (11/20/2023)    Exercise Vital Sign     Days of Exercise per Week: 5 days     Minutes of Exercise per Session: 30 min   Stress: Stress Concern Present (11/20/2023)    [employer] [place] [employer] - Occupational Stress Questionnaire     Feeling of Stress : Rather much   Social Connections: Moderately Integrated (11/20/2023)    Social Connection and Isolation Panel [NHANES]     Frequency of Communication with Friends and Family: More than three times a week     Frequency of Social Gatherings with Friends and Family: Three times a week     Attends Yazidism Services: 1 to 4 times per year     Active Member of Clubs or Organizations: No     Attends Club or Organization Meetings: 1 to 4 times per year     Marital Status:    Housing Stability: Low Risk  (11/20/2023)    Housing Stability Vital Sign     Unable to Pay for Housing in the Last Year: No     Number of Places Lived in the Last Year: 1     Unstable Housing in the Last Year: No        Current Outpatient Medications   Medication Instructions    [START ON 3/13/2024] ALPRAZolam (XANAX) 0.25 mg, Oral, Daily PRN    aspirin (ECOTRIN) 81 mg, Oral, Daily    citalopram (CELEXA) 40 mg, Oral, Daily    docusate sodium (COLACE) 100 mg, Oral, 2 times daily PRN    evolocumab (REPATHA SURECLICK) 140 mg/mL PnIj 1 mL, Subcutaneous, Every 14 days    fenofibrate (TRICOR) 145 mg, Oral, Daily    fish oil-omega-3 fatty acids 300-1,000 mg capsule 2 g, Oral, Daily    lisinopriL  "(PRINIVIL,ZESTRIL) 2.5 mg, Oral, 2 times daily    OCALIVA 5 mg Tab 1 tablet, Oral, Daily    pantoprazole (PROTONIX) 40 mg, Oral, Daily    ursodioL (ACTIGALL) 300 mg, Oral, 2 times daily       Review of patient's allergies indicates:  No Known Allergies     Patient Care Team:  Keyshawn Florez DO as PCP - General (Family Medicine)  Davie Torrez MD as Consulting Physician (Gastroenterology)     Subjective:     Review of Systems    12 point review of systems conducted, negative except as stated in the history of present illness. See HPI for details.    Objective:     Visit Vitals  BP (!) 140/92 (BP Location: Left arm, Patient Position: Standing, BP Method: Medium (Manual))   Pulse 74   Temp 97.8 °F (36.6 °C)   Resp 20   Ht 5' 4" (1.626 m)   Wt 70.8 kg (156 lb)   SpO2 99%   BMI 26.78 kg/m²       Physical Exam  Vitals and nursing note reviewed.   Constitutional:       General: She is not in acute distress.     Appearance: She is not ill-appearing.   HENT:      Head: Normocephalic and atraumatic.      Mouth/Throat:      Mouth: Mucous membranes are moist.      Pharynx: Oropharynx is clear.   Eyes:      General: No scleral icterus.     Extraocular Movements: Extraocular movements intact.      Conjunctiva/sclera: Conjunctivae normal.      Pupils: Pupils are equal, round, and reactive to light.   Neck:      Vascular: No carotid bruit.   Cardiovascular:      Rate and Rhythm: Normal rate and regular rhythm.      Heart sounds: No murmur heard.     No friction rub. No gallop.   Pulmonary:      Effort: Pulmonary effort is normal. No respiratory distress.      Breath sounds: Normal breath sounds. No wheezing, rhonchi or rales.   Abdominal:      General: Abdomen is flat. Bowel sounds are normal. There is no distension.      Palpations: Abdomen is soft. There is no mass.      Tenderness: There is no abdominal tenderness.   Musculoskeletal:         General: Normal range of motion.      Cervical back: Normal range of motion " and neck supple.   Skin:     General: Skin is warm and dry.   Neurological:      General: No focal deficit present.      Mental Status: She is alert.   Psychiatric:         Mood and Affect: Mood is anxious.       Labs Reviewed:   Chemistry:  Lab Results   Component Value Date     02/12/2024    K 4.7 02/12/2024    CHLORIDE 108 (H) 02/12/2024    BUN 22.0 (H) 02/12/2024    CREATININE 1.07 (H) 02/12/2024    EGFRNORACEVR 57 02/12/2024    GLUCOSE 96 02/12/2024    CALCIUM 10.5 (H) 02/12/2024    ALKPHOS 57 02/08/2024    LABPROT 7.4 02/08/2024    ALBUMIN 4.1 02/08/2024    BILIDIR 0.2 01/14/2022    IBILI 0.20 01/14/2022    AST 22 02/08/2024    ALT 20 02/08/2024    MG 2.00 01/14/2022    PIWXXDYC75GI 52.6 09/27/2023    TSH 2.292 04/24/2023        Lab Results   Component Value Date    HGBA1C 5.4 09/27/2023        Hematology:  Lab Results   Component Value Date    WBC 4.64 02/12/2024    HGB 14.1 02/12/2024    HCT 43.1 02/12/2024     02/12/2024       Lipid Panel:  Lab Results   Component Value Date    CHOL 159 09/27/2023    HDL 58 09/27/2023    LDL 85.00 09/27/2023    TRIG 79 09/27/2023    TOTALCHOLEST 3 09/27/2023      Assessment:       ICD-10-CM ICD-9-CM   1. RUPERTO (generalized anxiety disorder)  F41.1 300.02   2. Primary hypertension  I10 401.9        Plan:     1. RUPERTO (generalized anxiety disorder)  Assessment & Plan:  Patient was instructed not to alter their medication dosage without the consent of their healthcare provider. In the event that the patient chooses to increase their intake of Xanax, their prescription for Xanax will be terminated. The patient declined the option of being referred to Psychiatry today for therapy management in order to develop effective coping strategies.  Decrease Xanax to 0.25 mg daily PRN.   Increase Celexa to 40 mg daily.   RTC in one month.     Practice deep breathing or abdominal breathing exercises when anxiety occurs.  Exercise daily. Get sunlight daily.  Avoid caffeine, alcohol  and stimulants.  Practice positive phrases and repeat throughout the day, along with yoga and relaxation techniques.  Set healthy boundaries, avoid people and conversations that increase stress.  Educated patient on the risks of serotonin based medications such as serotonin modulators and SSRIs/SNRIs including common side effects of nausea, GI upset, headache dizziness as well as the rare risk for worsening symptoms of depression including development of suicidal thoughts or ideations, and serotonin syndrome.   Reports any symptoms of suicidal or homicidal ideations immediately, if clinic is closed go to nearest emergency room.    Orders:  -     citalopram (CELEXA) 20 MG tablet; Take 2 tablets (40 mg total) by mouth once daily.  Dispense: 90 tablet; Refill: 3  -     ALPRAZolam (XANAX) 0.25 MG tablet; Take 1 tablet (0.25 mg total) by mouth daily as needed for Anxiety.  Dispense: 30 tablet; Refill: 0    2. Primary hypertension  Assessment & Plan:  BP logs reviewed.   Increase Lisinopril to 5 mg daily.   RTC in one month with BP reading.   Advised patient to take BP two times a day only as BP fluctuates throughout the day.   Low Sodium Diet (DASH Diet - Less than 2 grams of sodium per day).  Monitor blood pressure daily and log. Report consistent numbers greater than 140/90.  Maintain healthy weight with goal BMI <30. Exercise 30 minutes per day, 5 days per week.    Orders:  -     lisinopriL (PRINIVIL,ZESTRIL) 2.5 MG tablet; Take 1 tablet (2.5 mg total) by mouth 2 (two) times a day.  Dispense: 60 tablet; Refill: 2      Follow up in about 1 month (around 3/19/2024) for BP and anxiety F/U. In addition to their scheduled follow up, the patient has also been instructed to follow up on as needed basis.     Future Appointments   Date Time Provider Department Center   2/19/2024 10:00 AM Olamide Hatch NP [facility]   3/19/2024  8:20 AM Olamide Hatch NP [facility]   5/21/2024  8:30 AM  Keyshawn Florez DO [facility]   11/22/2024  9:00 AM Keyshawn Florez DO [facility] Ka'Ryn Franklin, NP

## 2024-02-19 NOTE — ASSESSMENT & PLAN NOTE
Patient was instructed not to alter their medication dosage without the consent of their healthcare provider. In the event that the patient chooses to increase their intake of Xanax, their prescription for Xanax will be terminated. The patient declined the option of being referred to Psychiatry today for therapy management in order to develop effective coping strategies.  Decrease Xanax to 0.25 mg daily PRN.   Increase Celexa to 40 mg daily.   RTC in one month.     Practice deep breathing or abdominal breathing exercises when anxiety occurs.  Exercise daily. Get sunlight daily.  Avoid caffeine, alcohol and stimulants.  Practice positive phrases and repeat throughout the day, along with yoga and relaxation techniques.  Set healthy boundaries, avoid people and conversations that increase stress.  Educated patient on the risks of serotonin based medications such as serotonin modulators and SSRIs/SNRIs including common side effects of nausea, GI upset, headache dizziness as well as the rare risk for worsening symptoms of depression including development of suicidal thoughts or ideations, and serotonin syndrome.   Reports any symptoms of suicidal or homicidal ideations immediately, if clinic is closed go to nearest emergency room.

## 2024-02-19 NOTE — ASSESSMENT & PLAN NOTE
BP logs reviewed.   Increase Lisinopril to 5 mg daily.   RTC in one month with BP reading.   Advised patient to take BP two times a day only as BP fluctuates throughout the day.   Low Sodium Diet (DASH Diet - Less than 2 grams of sodium per day).  Monitor blood pressure daily and log. Report consistent numbers greater than 140/90.  Maintain healthy weight with goal BMI <30. Exercise 30 minutes per day, 5 days per week.

## 2024-03-15 ENCOUNTER — TELEPHONE (OUTPATIENT)
Dept: FAMILY MEDICINE | Facility: CLINIC | Age: 69
End: 2024-03-15
Payer: MEDICARE

## 2024-03-15 NOTE — TELEPHONE ENCOUNTER
----- Message from Dee Guaman MA sent at 3/15/2024 11:43 AM CDT -----  Regarding: FW: anxiety    ----- Message -----  From: Odalys Lane  Sent: 3/15/2024   8:08 AM CDT  To: [facility] Medicine Clinical Support Staff  Subject: anxiety                                          Patient was seen in February for anxiety by Lili, she was put on celexa was told to take 2 a day, since then she seen heart dr and he increased lisinapril to 5 instead of 2.5 she is waking up with slight high blood pressure, she doesn't feel like the anxiety medicaiton, celexa is working.   Would like a nurse to call  her back.

## 2024-03-19 ENCOUNTER — LAB VISIT (OUTPATIENT)
Dept: LAB | Facility: HOSPITAL | Age: 69
End: 2024-03-19
Attending: FAMILY MEDICINE
Payer: MEDICARE

## 2024-03-19 ENCOUNTER — OFFICE VISIT (OUTPATIENT)
Dept: FAMILY MEDICINE | Facility: CLINIC | Age: 69
End: 2024-03-19
Payer: MEDICARE

## 2024-03-19 VITALS
HEART RATE: 78 BPM | BODY MASS INDEX: 26.8 KG/M2 | DIASTOLIC BLOOD PRESSURE: 86 MMHG | TEMPERATURE: 97 F | OXYGEN SATURATION: 98 % | SYSTOLIC BLOOD PRESSURE: 128 MMHG | WEIGHT: 157 LBS | HEIGHT: 64 IN | RESPIRATION RATE: 16 BRPM

## 2024-03-19 DIAGNOSIS — F41.1 GAD (GENERALIZED ANXIETY DISORDER): ICD-10-CM

## 2024-03-19 DIAGNOSIS — I10 PRIMARY HYPERTENSION: ICD-10-CM

## 2024-03-19 DIAGNOSIS — I10 PRIMARY HYPERTENSION: Primary | ICD-10-CM

## 2024-03-19 LAB
T4 FREE SERPL-MCNC: 1 NG/DL (ref 0.7–1.48)
TSH SERPL-ACNC: 1.62 UIU/ML (ref 0.35–4.94)

## 2024-03-19 PROCEDURE — 36415 COLL VENOUS BLD VENIPUNCTURE: CPT

## 2024-03-19 PROCEDURE — 84439 ASSAY OF FREE THYROXINE: CPT

## 2024-03-19 PROCEDURE — 99214 OFFICE O/P EST MOD 30 MIN: CPT | Mod: ,,, | Performed by: FAMILY MEDICINE

## 2024-03-19 PROCEDURE — 84443 ASSAY THYROID STIM HORMONE: CPT

## 2024-03-19 RX ORDER — LISINOPRIL 5 MG/1
5 TABLET ORAL 2 TIMES DAILY
COMMUNITY
Start: 2024-03-15

## 2024-03-19 RX ORDER — PROPRANOLOL HYDROCHLORIDE 20 MG/1
20 TABLET ORAL 2 TIMES DAILY
Qty: 60 TABLET | Refills: 11 | Status: SHIPPED | OUTPATIENT
Start: 2024-03-19 | End: 2024-04-09

## 2024-03-19 NOTE — PROGRESS NOTES
Patient ID: [id number]     Chief Complaint: Anxiety (Causing elevated bp readings, cardiology upped lisinopril to 5mg BID. BP elevated at night and early mornings upper 140s-150s/90s but throughout day BP is ok 120s-130s/upper 80s)    HPI:     Mary Beth Henson is a 68 y.o. female here today for Anxiety (Causing elevated bp readings, cardiology upped lisinopril to 5mg BID. BP elevated at night and early mornings upper 140s-150s/90s but throughout day BP is ok 120s-130s/upper 80s). Did not tolerate the Xanax, has noted no benefit from Hydroxyzine.     ----------------------------  Anxiety  GERD (gastroesophageal reflux disease)  H pylori ulcer  Hyperlipidemia  Hypertension  Personal history of colonic polyps      Comment:  s/p polypectomy - Dr Davie Torrez  Primary biliary cholangitis     Past Surgical History:   Procedure Laterality Date     SECTION      COLONOSCOPY W/ BIOPSIES AND POLYPECTOMY  2021    Dr Davie Torrez    ESOPHAGOGASTRODUODENOSCOPY  2017    mucosa suggestive of Brasher's.     ESOPHAGOGASTRODUODENOSCOPY  2021    Dr Davie Torrez    EYE SURGERY  2023    FISTULA REPAIR      LIVER BIOPSY  2017    UPPER ENDOSCOPIC ULTRASOUND W/ FNA  2017    normal    UPPER GASTROINTESTINAL ENDOSCOPY         Review of patient's allergies indicates:  No Known Allergies    Outpatient Medications Marked as Taking for the 3/19/24 encounter (Office Visit) with Keyshawn Florez, DO   Medication Sig Dispense Refill    aspirin (ECOTRIN) 81 MG EC tablet Take 81 mg by mouth once daily.      citalopram (CELEXA) 20 MG tablet Take 2 tablets (40 mg total) by mouth once daily. 90 tablet 3    docusate sodium (COLACE) 100 MG capsule Take 100 mg by mouth 2 (two) times daily as needed for Constipation.      evolocumab (REPATHA SURECLICK) 140 mg/mL PnIj Inject 1 mL into the skin every 14 (fourteen) days.      fenofibrate (TRICOR) 145 MG tablet Take 145 mg by mouth once daily.      fish  oil-omega-3 fatty acids 300-1,000 mg capsule Take 2 g by mouth once daily.      lisinopriL (PRINIVIL,ZESTRIL) 5 MG tablet Take 5 mg by mouth 2 (two) times a day.      OCALIVA 5 mg Tab Take 1 tablet by mouth once daily.      pantoprazole (PROTONIX) 40 MG tablet Take 40 mg by mouth once daily.      ursodioL (ACTIGALL) 300 mg capsule Take 300 mg by mouth 2 (two) times daily.         Social History     Socioeconomic History    Marital status:     Number of children: 1   Occupational History    Occupation: retired   Tobacco Use    Smoking status: Former    Smokeless tobacco: Never    Tobacco comments:     Quit years ago dont remember dates   Substance and Sexual Activity    Alcohol use: No    Drug use: Never    Sexual activity: Not Currently     Social Determinants of Health     Financial Resource Strain: Patient Declined (3/16/2024)    Overall Financial Resource Strain (CARDIA)     Difficulty of Paying Living Expenses: Patient declined   Food Insecurity: Patient Declined (3/16/2024)    Hunger Vital Sign     Worried About Running Out of Food in the Last Year: Patient declined     Ran Out of Food in the Last Year: Patient declined   Transportation Needs: Patient Declined (3/16/2024)    PRAPARE - Transportation     Lack of Transportation (Medical): Patient declined     Lack of Transportation (Non-Medical): Patient declined   Physical Activity: Inactive (3/16/2024)    Exercise Vital Sign     Days of Exercise per Week: 0 days     Minutes of Exercise per Session: 30 min   Stress: Stress Concern Present (3/16/2024)    [employer] [place] [employer] - Occupational Stress Questionnaire     Feeling of Stress : Very much   Social Connections: Unknown (3/16/2024)    Social Connection and Isolation Panel [NHANES]     Frequency of Communication with Friends and Family: Patient declined     Frequency of Social Gatherings with Friends and Family: Patient declined     Attends Holiness Services: 1 to 4 times per year  "    Active Member of Clubs or Organizations: No     Attends Club or Organization Meetings: Never     Marital Status: Patient declined   Housing Stability: Unknown (3/16/2024)    Housing Stability Vital Sign     Unable to Pay for Housing in the Last Year: Patient declined     Number of Places Lived in the Last Year: 1     Unstable Housing in the Last Year: No        Family History   Problem Relation Age of Onset    Breast cancer Mother     Hypertension Mother     Heart disease Mother     Heart disease Father     Hypertension Father     Arthritis Father     Colon cancer Maternal Aunt         Patient Care Team:  Keyshawn Florez DO as PCP - General (Family Medicine)  Davie Torrez MD as Consulting Physician (Gastroenterology)  Naveed Gomez FNP as Nurse Practitioner (Cardiology)     Subjective:     Review of Systems   Constitutional:  Negative for chills and fever.   Respiratory:  Negative for shortness of breath.    Cardiovascular:  Negative for chest pain and palpitations.   Gastrointestinal:  Negative for constipation and diarrhea.   Neurological:  Positive for headaches. Negative for dizziness.   Psychiatric/Behavioral:  The patient is nervous/anxious. The patient does not have insomnia.      See HPI for details  All Other ROS: Negative except as stated in HPI.     Objective:     /86 (BP Location: Right arm)   Pulse 78   Temp 97.2 °F (36.2 °C) (Temporal)   Resp 16   Ht 5' 3.78" (1.62 m)   Wt 71.2 kg (157 lb)   SpO2 98%   BMI 27.14 kg/m²     Physical Exam  Vitals reviewed.   Constitutional:       General: She is not in acute distress.     Appearance: Normal appearance.   Cardiovascular:      Rate and Rhythm: Normal rate and regular rhythm.      Heart sounds: No murmur heard.     No friction rub. No gallop.   Pulmonary:      Effort: No respiratory distress.      Breath sounds: No wheezing, rhonchi or rales.   Musculoskeletal:         General: No swelling, tenderness or deformity.      Right " lower leg: No edema.      Left lower leg: No edema.   Skin:     General: Skin is warm and dry.      Findings: No lesion or rash.   Neurological:      General: No focal deficit present.      Mental Status: She is alert.   Psychiatric:         Mood and Affect: Mood normal.         Assessment/Plan:     1. Primary hypertension  -     TSH; Future; Expected date: 03/19/2024  -     T4, Free; Future; Expected date: 03/19/2024  -     propranoloL (INDERAL) 20 MG tablet; Take 1 tablet (20 mg total) by mouth 2 (two) times daily.  Dispense: 60 tablet; Refill: 11    2. RUPERTO (generalized anxiety disorder)     Continue celexa for now. Will see if propranolol helps manage symptoms of anxiety. Will regroup after patient returns from cruise to see if the celexa needs to be changed. Requested patient to make a list of anxiety medications she has been on in the past.      Follow up:     Follow up in about 2 weeks (around 4/2/2024) for Follow up with Dr. DEEJAY Steward In addition to their scheduled follow up, the patient has also been instructed to follow up on as needed basis.

## 2024-03-21 ENCOUNTER — TELEPHONE (OUTPATIENT)
Dept: FAMILY MEDICINE | Facility: CLINIC | Age: 69
End: 2024-03-21
Payer: MEDICARE

## 2024-03-21 DIAGNOSIS — F41.1 GAD (GENERALIZED ANXIETY DISORDER): Primary | ICD-10-CM

## 2024-03-21 RX ORDER — BUSPIRONE HYDROCHLORIDE 5 MG/1
5 TABLET ORAL 2 TIMES DAILY
Qty: 60 TABLET | Refills: 0 | Status: SHIPPED | OUTPATIENT
Start: 2024-03-21 | End: 2024-04-09 | Stop reason: SDUPTHER

## 2024-03-21 NOTE — TELEPHONE ENCOUNTER
----- Message from Odalys Lane sent at 3/21/2024  9:16 AM CDT -----  propranoloL (INDERAL) 20 MG tablet 60 tablet 11 3/19/2024 3/19/2025  Sig: Take 1 tablet (20 mg total) by mouth 2 (two) times daily.    Patient said since she started this medication her heart rate has dropped below 60.  She noticed the drop before and after taking the med

## 2024-03-25 ENCOUNTER — TELEPHONE (OUTPATIENT)
Dept: FAMILY MEDICINE | Facility: CLINIC | Age: 69
End: 2024-03-25
Payer: MEDICARE

## 2024-03-25 NOTE — TELEPHONE ENCOUNTER
----- Message from Keyshawn Florez DO sent at 3/25/2024  1:49 PM CDT -----  All lab results within acceptable ranges.

## 2024-04-02 ENCOUNTER — TELEPHONE (OUTPATIENT)
Dept: FAMILY MEDICINE | Facility: CLINIC | Age: 69
End: 2024-04-02
Payer: MEDICARE

## 2024-04-02 NOTE — TELEPHONE ENCOUNTER
----- Message from Keyshawn Florez DO sent at 3/25/2024  1:49 PM CDT -----  All lab results within acceptable ranges.   Render Post-Care Instructions In Note?: yes Post-Care Instructions: I reviewed with the patient in detail post-care instructions. Patient is to wear sunprotection, and avoid picking at any of the treated lesions. Pt may apply Vaseline to crusted or scabbing areas. Billing Information: Bill by Static Price Detail Level: Detailed Consent: The patient's consent was obtained including but not limited to risks of crusting, scabbing, blistering, scarring, darker or lighter pigmentary change, recurrence, incomplete removal and infection. The patient understands that the procedure is cosmetic in nature and is not covered by insurance.

## 2024-04-09 ENCOUNTER — OFFICE VISIT (OUTPATIENT)
Dept: FAMILY MEDICINE | Facility: CLINIC | Age: 69
End: 2024-04-09
Payer: MEDICARE

## 2024-04-09 VITALS
SYSTOLIC BLOOD PRESSURE: 131 MMHG | WEIGHT: 161 LBS | HEIGHT: 64 IN | HEART RATE: 75 BPM | TEMPERATURE: 97 F | DIASTOLIC BLOOD PRESSURE: 80 MMHG | BODY MASS INDEX: 27.49 KG/M2 | RESPIRATION RATE: 18 BRPM | OXYGEN SATURATION: 98 %

## 2024-04-09 DIAGNOSIS — N18.31 CHRONIC KIDNEY DISEASE, STAGE 3A: ICD-10-CM

## 2024-04-09 DIAGNOSIS — Z00.00 ROUTINE GENERAL MEDICAL EXAMINATION AT A HEALTH CARE FACILITY: Primary | ICD-10-CM

## 2024-04-09 DIAGNOSIS — I10 PRIMARY HYPERTENSION: Chronic | ICD-10-CM

## 2024-04-09 DIAGNOSIS — F41.1 GAD (GENERALIZED ANXIETY DISORDER): Primary | ICD-10-CM

## 2024-04-09 PROCEDURE — 99214 OFFICE O/P EST MOD 30 MIN: CPT | Mod: ,,, | Performed by: FAMILY MEDICINE

## 2024-04-09 RX ORDER — BUSPIRONE HYDROCHLORIDE 5 MG/1
5 TABLET ORAL 2 TIMES DAILY
Qty: 60 TABLET | Refills: 5 | Status: SHIPPED | OUTPATIENT
Start: 2024-04-09 | End: 2024-10-06

## 2024-04-09 NOTE — PROGRESS NOTES
Patient ID: [id number]     Chief Complaint: Follow-up    HPI:     Mary Beth Henson is a 68 y.o. female here today for Follow-up for anxiety and HTN. Anxiety has improved and as a result, so has her blood pressure. Currently on celexa 40mg qHS and and buspar 5mg qHS with a 2nd dose of buspar during the day PRN.     ----------------------------  Anxiety  GERD (gastroesophageal reflux disease)  H pylori ulcer  Hyperlipidemia  Hypertension  Personal history of colonic polyps      Comment:  s/p polypectomy - Dr Davie Torrez  Primary biliary cholangitis     Past Surgical History:   Procedure Laterality Date     SECTION      COLONOSCOPY W/ BIOPSIES AND POLYPECTOMY  2021    Dr Daive Torrez    ESOPHAGOGASTRODUODENOSCOPY  2017    mucosa suggestive of Brasher's.     ESOPHAGOGASTRODUODENOSCOPY  2021    Dr Davie Torrez    EYE SURGERY  2023    FISTULA REPAIR      LIVER BIOPSY  2017    UPPER ENDOSCOPIC ULTRASOUND W/ FNA  2017    normal    UPPER GASTROINTESTINAL ENDOSCOPY         Review of patient's allergies indicates:  No Known Allergies    Outpatient Medications Marked as Taking for the 24 encounter (Office Visit) with Keyshawn Florez,    Medication Sig Dispense Refill    aspirin (ECOTRIN) 81 MG EC tablet Take 81 mg by mouth once daily.      busPIRone (BUSPAR) 5 MG Tab Take 1 tablet (5 mg total) by mouth 2 (two) times daily. 60 tablet 0    citalopram (CELEXA) 20 MG tablet Take 2 tablets (40 mg total) by mouth once daily. 90 tablet 3    docusate sodium (COLACE) 100 MG capsule Take 100 mg by mouth 2 (two) times daily as needed for Constipation.      evolocumab (REPATHA SURECLICK) 140 mg/mL PnIj Inject 1 mL into the skin every 14 (fourteen) days.      fenofibrate (TRICOR) 145 MG tablet Take 145 mg by mouth once daily.      fish oil-omega-3 fatty acids 300-1,000 mg capsule Take 2 g by mouth once daily.      lisinopriL (PRINIVIL,ZESTRIL) 5 MG tablet Take 5 mg by mouth 2  (two) times a day.      OCALIVA 5 mg Tab Take 1 tablet by mouth once daily.      pantoprazole (PROTONIX) 40 MG tablet Take 40 mg by mouth once daily.      ursodioL (ACTIGALL) 300 mg capsule Take 300 mg by mouth 2 (two) times daily.         Social History     Socioeconomic History    Marital status:     Number of children: 1   Occupational History    Occupation: retired   Tobacco Use    Smoking status: Former    Smokeless tobacco: Never    Tobacco comments:     Quit years ago dont remember dates   Substance and Sexual Activity    Alcohol use: No    Drug use: Never    Sexual activity: Not Currently     Social Determinants of Health     Financial Resource Strain: Low Risk  (4/9/2024)    Overall Financial Resource Strain (CARDIA)     Difficulty of Paying Living Expenses: Not hard at all   Food Insecurity: No Food Insecurity (4/9/2024)    Hunger Vital Sign     Worried About Running Out of Food in the Last Year: Never true     Ran Out of Food in the Last Year: Never true   Transportation Needs: No Transportation Needs (4/9/2024)    PRAPARE - Transportation     Lack of Transportation (Medical): No     Lack of Transportation (Non-Medical): No   Physical Activity: Inactive (3/16/2024)    Exercise Vital Sign     Days of Exercise per Week: 0 days     Minutes of Exercise per Session: 30 min   Stress: Stress Concern Present (3/16/2024)    [employer] [place] [employer] - Occupational Stress Questionnaire     Feeling of Stress : Very much   Social Connections: Moderately Isolated (4/9/2024)    Social Connection and Isolation Panel [NHANES]     Frequency of Communication with Friends and Family: More than three times a week     Frequency of Social Gatherings with Friends and Family: More than three times a week     Attends [employer] Services: 1 to 4 times per year     Active Member of Clubs or Organizations: No     Attends Club or Organization Meetings: Never     Marital Status:    Housing Stability: Low  "Risk  (4/9/2024)    Housing Stability Vital Sign     Unable to Pay for Housing in the Last Year: No     Number of Places Lived in the Last Year: 1     Unstable Housing in the Last Year: No        Family History   Problem Relation Age of Onset    Breast cancer Mother     Hypertension Mother     Heart disease Mother     Heart disease Father     Hypertension Father     Arthritis Father     Colon cancer Maternal Aunt         Patient Care Team:  Keyshawn Florez DO as PCP - General (Family Medicine)  Davie Torrez MD as Consulting Physician (Gastroenterology)  Naveed Gomez FNP as Nurse Practitioner (Cardiology)     Subjective:     Review of Systems   Constitutional:  Negative for chills and fever.   Respiratory:  Negative for shortness of breath.    Cardiovascular:  Negative for chest pain.   Gastrointestinal:  Negative for constipation and diarrhea.   Neurological:  Negative for dizziness and headaches.   Psychiatric/Behavioral:  The patient is not nervous/anxious and does not have insomnia.        See HPI for details  All Other ROS: Negative except as stated in HPI.     Objective:     /80   Pulse 75   Temp 97.1 °F (36.2 °C) (Temporal)   Resp 18   Ht 5' 3.78" (1.62 m)   Wt 73 kg (161 lb)   SpO2 98%   BMI 27.83 kg/m²     Physical Exam  Vitals reviewed.   Constitutional:       General: She is not in acute distress.     Appearance: Normal appearance.   Cardiovascular:      Rate and Rhythm: Normal rate and regular rhythm.      Heart sounds: No murmur heard.     No friction rub. No gallop.   Pulmonary:      Effort: No respiratory distress.      Breath sounds: No wheezing, rhonchi or rales.   Musculoskeletal:         General: No swelling, tenderness or deformity.      Right lower leg: No edema.      Left lower leg: No edema.   Skin:     General: Skin is warm and dry.      Findings: No lesion or rash.   Neurological:      General: No focal deficit present.      Mental Status: She is alert. "   Psychiatric:         Mood and Affect: Mood normal.       Assessment/Plan:     1. RUPERTO (generalized anxiety disorder)  -Continue celexa 40mg qHS and Buspar 5mg BID PRN  2. Primary hypertension  -well controlled on lisinopril 5mg BID.       Follow up:     Follow up in about 7 months (around 11/21/2024) for Medicare Wellness. In addition to their scheduled follow up, the patient has also been instructed to follow up on as needed basis.

## 2024-04-30 ENCOUNTER — LAB VISIT (OUTPATIENT)
Dept: LAB | Facility: HOSPITAL | Age: 69
End: 2024-04-30
Attending: NURSE PRACTITIONER
Payer: MEDICARE

## 2024-04-30 DIAGNOSIS — K74.2 HEPATIC FIBROSIS WITH HEPATIC SCLEROSIS: ICD-10-CM

## 2024-04-30 DIAGNOSIS — L21.9 SEBORRHEIC DERMATITIS, UNSPECIFIED: ICD-10-CM

## 2024-04-30 DIAGNOSIS — K59.00 COLONIC CONSTIPATION: ICD-10-CM

## 2024-04-30 DIAGNOSIS — K22.70 BARRETT'S ESOPHAGUS: Primary | ICD-10-CM

## 2024-04-30 DIAGNOSIS — K57.30 DIVERTICULOSIS OF LARGE INTESTINE WITHOUT DIVERTICULITIS: ICD-10-CM

## 2024-04-30 DIAGNOSIS — K74.3 CHOLANGITIC CIRRHOSIS: ICD-10-CM

## 2024-04-30 LAB
ALBUMIN SERPL-MCNC: 4.3 G/DL (ref 3.4–4.8)
ALBUMIN/GLOB SERPL: 1.4 RATIO (ref 1.1–2)
ALP SERPL-CCNC: 51 UNIT/L (ref 40–150)
ALT SERPL-CCNC: 25 UNIT/L (ref 0–55)
AST SERPL-CCNC: 25 UNIT/L (ref 5–34)
BASOPHILS # BLD AUTO: 0.02 X10(3)/MCL
BASOPHILS NFR BLD AUTO: 0.3 %
BILIRUB SERPL-MCNC: 0.4 MG/DL
BUN SERPL-MCNC: 26 MG/DL (ref 9.8–20.1)
CALCIUM SERPL-MCNC: 10.3 MG/DL (ref 8.4–10.2)
CHLORIDE SERPL-SCNC: 110 MMOL/L (ref 98–107)
CHOLEST SERPL-MCNC: 136 MG/DL
CHOLEST/HDLC SERPL: 2 {RATIO} (ref 0–5)
CO2 SERPL-SCNC: 26 MMOL/L (ref 23–31)
CREAT SERPL-MCNC: 1.38 MG/DL (ref 0.55–1.02)
CRP SERPL-MCNC: 3.3 MG/L
DEPRECATED CALCIDIOL+CALCIFEROL SERPL-MC: 35.7 NG/ML (ref 30–80)
EOSINOPHIL # BLD AUTO: 0.08 X10(3)/MCL (ref 0–0.9)
EOSINOPHIL NFR BLD AUTO: 1.3 %
ERYTHROCYTE [DISTWIDTH] IN BLOOD BY AUTOMATED COUNT: 13.3 % (ref 11.5–17)
ERYTHROCYTE [SEDIMENTATION RATE] IN BLOOD: 13 MM/HR (ref 0–20)
FERRITIN SERPL-MCNC: 156.04 NG/ML (ref 4.63–204)
GFR SERPLBLD CREATININE-BSD FMLA CKD-EPI: 42 MLS/MIN/1.73/M2
GGT SERPL-CCNC: 26 U/L (ref 9–36)
GLOBULIN SER-MCNC: 3.1 GM/DL (ref 2.4–3.5)
GLUCOSE SERPL-MCNC: 104 MG/DL (ref 82–115)
HCT VFR BLD AUTO: 39.1 % (ref 37–47)
HDLC SERPL-MCNC: 59 MG/DL (ref 35–60)
HGB BLD-MCNC: 12.8 G/DL (ref 12–16)
IMM GRANULOCYTES # BLD AUTO: 0.01 X10(3)/MCL (ref 0–0.04)
IMM GRANULOCYTES NFR BLD AUTO: 0.2 %
INR PPP: 0.9
LDLC SERPL CALC-MCNC: 61 MG/DL (ref 50–140)
LYMPHOCYTES # BLD AUTO: 1.51 X10(3)/MCL (ref 0.6–4.6)
LYMPHOCYTES NFR BLD AUTO: 25.3 %
MCH RBC QN AUTO: 28.9 PG (ref 27–31)
MCHC RBC AUTO-ENTMCNC: 32.7 G/DL (ref 33–36)
MCV RBC AUTO: 88.3 FL (ref 80–94)
MONOCYTES # BLD AUTO: 0.45 X10(3)/MCL (ref 0.1–1.3)
MONOCYTES NFR BLD AUTO: 7.5 %
NEUTROPHILS # BLD AUTO: 3.9 X10(3)/MCL (ref 2.1–9.2)
NEUTROPHILS NFR BLD AUTO: 65.4 %
NRBC BLD AUTO-RTO: 0 %
PLATELET # BLD AUTO: 264 X10(3)/MCL (ref 130–400)
PMV BLD AUTO: 11 FL (ref 7.4–10.4)
POTASSIUM SERPL-SCNC: 5.4 MMOL/L (ref 3.5–5.1)
PROT SERPL-MCNC: 7.4 GM/DL (ref 5.8–7.6)
PROTHROMBIN TIME: 9.9 SECONDS (ref 9.1–10.9)
RBC # BLD AUTO: 4.43 X10(6)/MCL (ref 4.2–5.4)
SODIUM SERPL-SCNC: 144 MMOL/L (ref 136–145)
TRIGL SERPL-MCNC: 81 MG/DL (ref 37–140)
VLDLC SERPL CALC-MCNC: 16 MG/DL
WBC # SPEC AUTO: 5.97 X10(3)/MCL (ref 4.5–11.5)

## 2024-04-30 PROCEDURE — 82977 ASSAY OF GGT: CPT

## 2024-04-30 PROCEDURE — 82728 ASSAY OF FERRITIN: CPT

## 2024-04-30 PROCEDURE — 85025 COMPLETE CBC W/AUTO DIFF WBC: CPT

## 2024-04-30 PROCEDURE — 85652 RBC SED RATE AUTOMATED: CPT

## 2024-04-30 PROCEDURE — 82306 VITAMIN D 25 HYDROXY: CPT

## 2024-04-30 PROCEDURE — 36415 COLL VENOUS BLD VENIPUNCTURE: CPT

## 2024-04-30 PROCEDURE — 80061 LIPID PANEL: CPT

## 2024-04-30 PROCEDURE — 86140 C-REACTIVE PROTEIN: CPT

## 2024-04-30 PROCEDURE — 80053 COMPREHEN METABOLIC PANEL: CPT

## 2024-04-30 PROCEDURE — 85610 PROTHROMBIN TIME: CPT

## 2024-08-26 DIAGNOSIS — F41.1 GAD (GENERALIZED ANXIETY DISORDER): ICD-10-CM

## 2024-08-26 RX ORDER — CITALOPRAM 20 MG/1
40 TABLET, FILM COATED ORAL DAILY
Qty: 90 TABLET | Refills: 3 | Status: SHIPPED | OUTPATIENT
Start: 2024-08-26

## 2024-09-18 ENCOUNTER — LAB VISIT (OUTPATIENT)
Dept: LAB | Facility: HOSPITAL | Age: 69
End: 2024-09-18
Attending: NURSE PRACTITIONER
Payer: MEDICARE

## 2024-09-18 DIAGNOSIS — R74.8 ELEVATED ALKALINE PHOSPHATASE LEVEL: ICD-10-CM

## 2024-09-18 DIAGNOSIS — K59.00 COLONIC CONSTIPATION: ICD-10-CM

## 2024-09-18 DIAGNOSIS — K82.4 POLYP OF GALLBLADDER: ICD-10-CM

## 2024-09-18 DIAGNOSIS — K74.3 PRIMARY BILIARY CHOLANGITIS: ICD-10-CM

## 2024-09-18 DIAGNOSIS — N18.31 CHRONIC KIDNEY DISEASE, STAGE 3A: ICD-10-CM

## 2024-09-18 DIAGNOSIS — K29.00 ACUTE GASTRITIS: ICD-10-CM

## 2024-09-18 DIAGNOSIS — K21.9 GASTROESOPHAGEAL REFLUX DISEASE, UNSPECIFIED WHETHER ESOPHAGITIS PRESENT: ICD-10-CM

## 2024-09-18 DIAGNOSIS — K44.9 DIAPHRAGMATIC HERNIA WITHOUT OBSTRUCTION OR GANGRENE: ICD-10-CM

## 2024-09-18 DIAGNOSIS — M65.312 TRIGGER FINGER OF LEFT THUMB: ICD-10-CM

## 2024-09-18 DIAGNOSIS — K57.30 DIVERTICULOSIS OF LARGE INTESTINE WITHOUT DIVERTICULITIS: ICD-10-CM

## 2024-09-18 DIAGNOSIS — K74.3 CHOLANGITIC CIRRHOSIS: ICD-10-CM

## 2024-09-18 DIAGNOSIS — K57.30 DVRTCLOS OF LG INT W/O PERFORATION OR ABSCESS W/O BLEEDING: ICD-10-CM

## 2024-09-18 DIAGNOSIS — K74.2 HEPATIC FIBROSIS WITH HEPATIC SCLEROSIS: ICD-10-CM

## 2024-09-18 DIAGNOSIS — K22.70 BARRETT'S ESOPHAGUS: Primary | ICD-10-CM

## 2024-09-18 DIAGNOSIS — I10 PRIMARY HYPERTENSION: ICD-10-CM

## 2024-09-18 DIAGNOSIS — Z80.0 FAMILY HISTORY OF MALIGNANT NEOPLASM OF GASTROINTESTINAL TRACT: ICD-10-CM

## 2024-09-18 DIAGNOSIS — R41.3 IMPAIRED MEMORY: ICD-10-CM

## 2024-09-18 DIAGNOSIS — F41.1 GAD (GENERALIZED ANXIETY DISORDER): ICD-10-CM

## 2024-09-18 DIAGNOSIS — K80.20 BILIARY CALCULUS: ICD-10-CM

## 2024-09-18 DIAGNOSIS — E78.2 MIXED HYPERLIPIDEMIA: ICD-10-CM

## 2024-09-18 LAB
25(OH)D3+25(OH)D2 SERPL-MCNC: 60 NG/ML (ref 30–80)
AFP-TM SERPL-MCNC: 2.9 NG/ML
ALBUMIN SERPL-MCNC: 4.2 G/DL (ref 3.4–4.8)
ALBUMIN/GLOB SERPL: 1.2 RATIO (ref 1.1–2)
ALP SERPL-CCNC: 44 UNIT/L (ref 40–150)
ALT SERPL-CCNC: 20 UNIT/L (ref 0–55)
ANION GAP SERPL CALC-SCNC: 6 MEQ/L
AST SERPL-CCNC: 22 UNIT/L (ref 5–34)
BASOPHILS # BLD AUTO: 0.02 X10(3)/MCL
BASOPHILS NFR BLD AUTO: 0.5 %
BILIRUB SERPL-MCNC: 0.5 MG/DL
BUN SERPL-MCNC: 32 MG/DL (ref 9.8–20.1)
CALCIUM SERPL-MCNC: 10.7 MG/DL (ref 8.4–10.2)
CHLORIDE SERPL-SCNC: 110 MMOL/L (ref 98–107)
CHOLEST SERPL-MCNC: 151 MG/DL
CHOLEST/HDLC SERPL: 2 {RATIO} (ref 0–5)
CO2 SERPL-SCNC: 26 MMOL/L (ref 23–31)
CREAT SERPL-MCNC: 1.41 MG/DL (ref 0.55–1.02)
CREAT/UREA NIT SERPL: 23
EOSINOPHIL # BLD AUTO: 0.08 X10(3)/MCL (ref 0–0.9)
EOSINOPHIL NFR BLD AUTO: 2 %
ERYTHROCYTE [DISTWIDTH] IN BLOOD BY AUTOMATED COUNT: 13.6 % (ref 11.5–17)
ERYTHROCYTE [SEDIMENTATION RATE] IN BLOOD: 22 MM/HR (ref 0–20)
FERRITIN SERPL-MCNC: 173.62 NG/ML (ref 4.63–204)
GFR SERPLBLD CREATININE-BSD FMLA CKD-EPI: 41 ML/MIN/1.73/M2
GLOBULIN SER-MCNC: 3.5 GM/DL (ref 2.4–3.5)
GLUCOSE SERPL-MCNC: 103 MG/DL (ref 82–115)
HCT VFR BLD AUTO: 39.7 % (ref 37–47)
HDLC SERPL-MCNC: 65 MG/DL (ref 35–60)
HGB BLD-MCNC: 12.6 G/DL (ref 12–16)
IMM GRANULOCYTES # BLD AUTO: 0 X10(3)/MCL (ref 0–0.04)
IMM GRANULOCYTES NFR BLD AUTO: 0 %
INR PPP: 0.9
LDLC SERPL CALC-MCNC: 73 MG/DL (ref 50–140)
LYMPHOCYTES # BLD AUTO: 1.53 X10(3)/MCL (ref 0.6–4.6)
LYMPHOCYTES NFR BLD AUTO: 38.2 %
MCH RBC QN AUTO: 28.4 PG (ref 27–31)
MCHC RBC AUTO-ENTMCNC: 31.7 G/DL (ref 33–36)
MCV RBC AUTO: 89.4 FL (ref 80–94)
MONOCYTES # BLD AUTO: 0.38 X10(3)/MCL (ref 0.1–1.3)
MONOCYTES NFR BLD AUTO: 9.5 %
NEUTROPHILS # BLD AUTO: 2 X10(3)/MCL (ref 2.1–9.2)
NEUTROPHILS NFR BLD AUTO: 49.8 %
NRBC BLD AUTO-RTO: 0 %
PLATELET # BLD AUTO: 265 X10(3)/MCL (ref 130–400)
PMV BLD AUTO: 11.8 FL (ref 7.4–10.4)
POTASSIUM SERPL-SCNC: 5.3 MMOL/L (ref 3.5–5.1)
PROT SERPL-MCNC: 7.7 GM/DL (ref 5.8–7.6)
PROTHROMBIN TIME: 9.9 SECONDS (ref 9.1–10.9)
RBC # BLD AUTO: 4.44 X10(6)/MCL (ref 4.2–5.4)
SODIUM SERPL-SCNC: 142 MMOL/L (ref 136–145)
TRIGL SERPL-MCNC: 67 MG/DL (ref 37–140)
VLDLC SERPL CALC-MCNC: 13 MG/DL
WBC # BLD AUTO: 4.01 X10(3)/MCL (ref 4.5–11.5)

## 2024-09-18 PROCEDURE — 85652 RBC SED RATE AUTOMATED: CPT

## 2024-09-18 PROCEDURE — 80061 LIPID PANEL: CPT

## 2024-09-18 PROCEDURE — 85025 COMPLETE CBC W/AUTO DIFF WBC: CPT

## 2024-09-18 PROCEDURE — 82105 ALPHA-FETOPROTEIN SERUM: CPT

## 2024-09-18 PROCEDURE — 80053 COMPREHEN METABOLIC PANEL: CPT

## 2024-09-18 PROCEDURE — 82306 VITAMIN D 25 HYDROXY: CPT

## 2024-09-18 PROCEDURE — 36415 COLL VENOUS BLD VENIPUNCTURE: CPT

## 2024-09-18 PROCEDURE — 85610 PROTHROMBIN TIME: CPT

## 2024-09-18 PROCEDURE — 82728 ASSAY OF FERRITIN: CPT

## 2024-11-11 ENCOUNTER — HOSPITAL ENCOUNTER (OUTPATIENT)
Dept: RADIOLOGY | Facility: HOSPITAL | Age: 69
Discharge: HOME | End: 2024-11-11
Attending: FAMILY MEDICINE
Payer: MEDICARE

## 2024-11-11 ENCOUNTER — TELEPHONE (OUTPATIENT)
Dept: FAMILY MEDICINE | Facility: CLINIC | Age: 69
End: 2024-11-11
Payer: MEDICARE

## 2024-11-11 DIAGNOSIS — Z12.31 ENCOUNTER FOR SCREENING MAMMOGRAM FOR MALIGNANT NEOPLASM OF BREAST: ICD-10-CM

## 2024-11-11 PROCEDURE — 77067 SCR MAMMO BI INCL CAD: CPT | Mod: TC

## 2024-11-11 PROCEDURE — 77067 SCR MAMMO BI INCL CAD: CPT | Mod: 26,,, | Performed by: RADIOLOGY

## 2024-11-11 PROCEDURE — 77063 BREAST TOMOSYNTHESIS BI: CPT | Mod: 26,,, | Performed by: RADIOLOGY

## 2024-11-11 NOTE — TELEPHONE ENCOUNTER
----- Message from Olamide Hatch NP sent at 11/11/2024  2:32 PM CST -----  Please inform patient of lab results.     1. Kidney function has improved.     2. All other labs within acceptable ranges.     Will discuss at upcoming appointment.     Thanks for all you do,   Olamide

## 2024-11-14 DIAGNOSIS — F41.1 GAD (GENERALIZED ANXIETY DISORDER): ICD-10-CM

## 2024-11-14 RX ORDER — BUSPIRONE HYDROCHLORIDE 5 MG/1
5 TABLET ORAL 2 TIMES DAILY
Qty: 60 TABLET | Refills: 5 | Status: SHIPPED | OUTPATIENT
Start: 2024-11-14

## 2024-11-22 ENCOUNTER — OFFICE VISIT (OUTPATIENT)
Dept: FAMILY MEDICINE | Facility: CLINIC | Age: 69
End: 2024-11-22
Payer: MEDICARE

## 2024-11-22 VITALS
HEIGHT: 66 IN | OXYGEN SATURATION: 98 % | HEART RATE: 67 BPM | WEIGHT: 158.19 LBS | TEMPERATURE: 97 F | DIASTOLIC BLOOD PRESSURE: 74 MMHG | BODY MASS INDEX: 25.42 KG/M2 | SYSTOLIC BLOOD PRESSURE: 128 MMHG | RESPIRATION RATE: 18 BRPM

## 2024-11-22 DIAGNOSIS — F41.1 GAD (GENERALIZED ANXIETY DISORDER): ICD-10-CM

## 2024-11-22 DIAGNOSIS — K74.3 PRIMARY BILIARY CHOLANGITIS: ICD-10-CM

## 2024-11-22 DIAGNOSIS — N18.32 STAGE 3B CHRONIC KIDNEY DISEASE: ICD-10-CM

## 2024-11-22 DIAGNOSIS — Z00.00 ENCOUNTER FOR PREVENTIVE HEALTH EXAMINATION: Primary | ICD-10-CM

## 2024-11-22 RX ORDER — BUSPIRONE HYDROCHLORIDE 5 MG/1
5 TABLET ORAL 3 TIMES DAILY
Qty: 90 TABLET | Refills: 5 | Status: SHIPPED | OUTPATIENT
Start: 2024-11-22

## 2024-11-22 NOTE — PATIENT INSTRUCTIONS
Medicare Annual Wellness Visit      Patient Name: Mary Beth Henson  Today's Date: 11/22/2024    Below you will find your 5-10 year Screening Plan Recommendations:  Health Maintenance       Date Due Completion Date    Annual UACr Never done ---    Pneumococcal Vaccines (Age 65+) (1 of 2 - PCV) Never done ---    TETANUS VACCINE Never done ---    Shingles Vaccine (1 of 2) Never done ---    RSV Vaccine (Age 60+ and Pregnant patients) (1 - Risk 60-74 years 1-dose series) Never done ---    COVID-19 Vaccine (4 - 2024-25 season) 09/01/2024 5/1/2021    DEXA Scan 02/17/2025 2/17/2023    Mammogram 11/11/2025 11/11/2024    Colorectal Cancer Screening 07/19/2026 7/19/2021    Hemoglobin A1c (Diabetic Prevention Screening) 09/27/2026 9/27/2023    Lipid Panel 11/11/2029 11/11/2024          Below is your summarized Personalized Prevention Plan that addresses any concerns we discussed today at your visit. Please see attached detailed information specific to your Health Concerns.  No orders of the defined types were placed in this encounter.        The following information is provided to all patients.  This information is to help you find additional resources for any problems that may be affecting your health: Living healthy guide: www.[facility].[place].gov      Understanding Diabetes: www.diabetes.org      Eating healthy: www.cdc.gov/healthyweight      CDC home safety checklist: www.cdc.gov/steadi/patient.html      Agency on Aging: www.goea.[place].[facility]      Alcoholics anonymous (AA): www.aa.org      Physical Activity: www.puja.nih.gov/hc3kxdh      Tobacco use: www.quitwithusla.org

## 2024-11-22 NOTE — PROGRESS NOTES
"   Internal Medicine    Mary Beth Henson is a 69 y.o. female here today for a Medicare Annual Wellness visit and comprehensive Health Risk Assessment. Reviewed recent labs with patient.     Subjective   The following components were reviewed and updated:  Medical history  Family History  Social history  Allergies  Current Medications  Immunizations  Health Maintenance  Patient Care Team    Review of Systems  A comprehensive review of systems was conducted and is negative except as noted above.     Objective   Visit Vitals  /74 (BP Location: Left arm, Patient Position: Sitting)   Pulse 67   Temp 97.2 °F (36.2 °C) (Temporal)   Resp 18   Ht 5' 6" (1.676 m)   Wt 71.8 kg (158 lb 3.2 oz)   SpO2 98%   BMI 25.53 kg/m²        Physical Exam  Vitals reviewed.   Constitutional:       General: She is not in acute distress.     Appearance: Normal appearance.   Cardiovascular:      Rate and Rhythm: Normal rate and regular rhythm.      Heart sounds: No murmur heard.     No friction rub. No gallop.   Pulmonary:      Effort: No respiratory distress.      Breath sounds: No wheezing, rhonchi or rales.   Musculoskeletal:         General: No swelling, tenderness or deformity.      Right lower leg: No edema.      Left lower leg: No edema.   Skin:     General: Skin is warm and dry.      Findings: No lesion or rash.   Neurological:      General: No focal deficit present.      Mental Status: She is alert.   Psychiatric:         Mood and Affect: Mood normal.          Assessment/Plan:  1. Encounter for preventive health examination    2. Stage 3b chronic kidney disease  -     Ambulatory referral/consult to Nephrology; Future; Expected date: 11/22/2024  -patient's renal function appears to be slowly declining with no apparent cause. Will refer to nephrology out of an abundance of caution.   3. Primary biliary cholangitis  Currently on ursodiol and Ocaliva.     A comprehensive HEALTH RISK ASSESSMENT was completed today. Results are " summarized below:    There are NO EMOTIONAL/SOCIAL CONCERNS identified on today's screening for Social Isolation, Depression and Anxiety.    There are NO COGNITIVE FUNCTION CONCERNS identified on today's screening.    The following FUNCTIONAL AND/OR SAFETY CONCERNS were identified on today's screening for Physical Symptoms, Nutritional, Home Safety/Living Situation, Fall Risk, Activities of Daily Living, Independent Activities of Daily Living, Physical Activity,Timed Up and Go test and Whisper test::    *Patient reports NO PREVENTIVE HOME HAZARD EVALUATION OR MODIFICATION. (Have you or someone else evaluated or modified your home with additional safety features like handrails on all stairs, installed grab bars in the bathroom, secured loose rugs and ensured good lighting in all areas?: (!) No)    The patient reports NO OPIOID PRESCRIPTIONS. This was confirmed through medication reconciliation and the [facility] website.    The patient is NOT A TOBACCO USER.        All Questions regarding food, transportation or housing were not answered today.      I provided Mary Beth Henson with a 5-10 year written Screening Schedule per USPSTF age appropriate recommendations and a Personal Prevention Plan based on the results of today's Health Risk Assessment. Education, counseling, and referrals were provided as documented above and can be viewed in the After Visit Summary.    Follow up in about 6 months (around 5/22/2025) for Follow up chronic conditions. In addition to this scheduled follow up, the patient has also been instructed to follow up on as needed basis.     none  The patient was asked and declined the use of a free .  Advance Care Planning   Today we discussed advance care planning. She is interested in learning more about how to make Advance Directives. Information was provided and I offered to discuss more at her discretion.

## 2025-02-13 DIAGNOSIS — F41.1 GAD (GENERALIZED ANXIETY DISORDER): ICD-10-CM

## 2025-02-13 RX ORDER — CITALOPRAM 20 MG/1
40 TABLET, FILM COATED ORAL
Qty: 90 TABLET | Refills: 3 | Status: SHIPPED | OUTPATIENT
Start: 2025-02-13

## 2025-02-17 ENCOUNTER — HOSPITAL ENCOUNTER (OUTPATIENT)
Dept: RADIOLOGY | Facility: HOSPITAL | Age: 70
Discharge: HOME | End: 2025-02-17
Attending: INTERNAL MEDICINE
Payer: MEDICARE

## 2025-02-17 DIAGNOSIS — N18.30 CKD (CHRONIC KIDNEY DISEASE), STAGE III: ICD-10-CM

## 2025-02-17 PROCEDURE — 76770 US EXAM ABDO BACK WALL COMP: CPT | Mod: TC

## 2025-04-01 DIAGNOSIS — I10 PRIMARY HYPERTENSION: Primary | Chronic | ICD-10-CM

## 2025-04-01 RX ORDER — LISINOPRIL 5 MG/1
5 TABLET ORAL 2 TIMES DAILY
Qty: 60 TABLET | Refills: 11 | Status: SHIPPED | OUTPATIENT
Start: 2025-04-01

## 2025-05-01 ENCOUNTER — HOSPITAL ENCOUNTER (OUTPATIENT)
Dept: RADIOLOGY | Facility: HOSPITAL | Age: 70
Discharge: HOME | End: 2025-05-01
Attending: NURSE PRACTITIONER
Payer: MEDICARE

## 2025-05-01 DIAGNOSIS — K74.3 PRIMARY BILIARY CHOLANGITIS: ICD-10-CM

## 2025-05-01 DIAGNOSIS — K22.70 BARRETT'S ESOPHAGUS WITHOUT DYSPLASIA: ICD-10-CM

## 2025-05-01 DIAGNOSIS — K21.9 GERD (GASTROESOPHAGEAL REFLUX DISEASE): ICD-10-CM

## 2025-05-01 DIAGNOSIS — Z86.0100 HX OF COLONIC POLYP: ICD-10-CM

## 2025-05-01 DIAGNOSIS — K74.2 HEPATIC FIBROSIS WITH HEPATIC SCLEROSIS: ICD-10-CM

## 2025-05-01 PROCEDURE — 76700 US EXAM ABDOM COMPLETE: CPT | Mod: TC

## 2025-05-28 ENCOUNTER — OFFICE VISIT (OUTPATIENT)
Dept: FAMILY MEDICINE | Facility: CLINIC | Age: 70
End: 2025-05-28
Payer: MEDICARE

## 2025-05-28 VITALS
RESPIRATION RATE: 18 BRPM | HEART RATE: 73 BPM | DIASTOLIC BLOOD PRESSURE: 79 MMHG | HEIGHT: 66 IN | SYSTOLIC BLOOD PRESSURE: 120 MMHG | OXYGEN SATURATION: 97 % | WEIGHT: 161 LBS | BODY MASS INDEX: 25.88 KG/M2

## 2025-05-28 DIAGNOSIS — K74.3 PRIMARY BILIARY CHOLANGITIS: ICD-10-CM

## 2025-05-28 DIAGNOSIS — I70.0 AORTIC ATHEROSCLEROSIS: ICD-10-CM

## 2025-05-28 DIAGNOSIS — Z78.0 POST-MENOPAUSAL: ICD-10-CM

## 2025-05-28 DIAGNOSIS — N18.32 STAGE 3B CHRONIC KIDNEY DISEASE: Primary | ICD-10-CM

## 2025-05-28 PROCEDURE — 99214 OFFICE O/P EST MOD 30 MIN: CPT | Mod: ,,, | Performed by: FAMILY MEDICINE

## 2025-05-28 RX ORDER — CHOLECALCIFEROL (VITAMIN D3) 50 MCG
2000 TABLET ORAL EVERY OTHER DAY
COMMUNITY

## 2025-05-28 NOTE — PROGRESS NOTES
Patient ID: [id number]     Chief Complaint: Chronic Kidney Disease (Patient is here for a CKD follow up, she saw Dr. Ashlie Bowles regarding this issue.)    HPI:     Mary Beth Henson is a 70 y.o. female here today for Chronic Kidney Disease (Patient is here for a CKD follow up, she saw Dr. Ashlie Bowles regarding this issue.). Referred to Rheumatology for Lupus evaluation.     History of Present Illness               -------------------------------------    Anxiety    GERD (gastroesophageal reflux disease)    H pylori ulcer    Hyperlipidemia    Hypertension    Personal history of colonic polyps    s/p polypectomy - Dr Davie Torrez    Primary biliary cholangitis        Past Surgical History:   Procedure Laterality Date     SECTION       SECTION      COLONOSCOPY W/ BIOPSIES AND POLYPECTOMY  2021    Dr Davie Torrez    ESOPHAGOGASTRODUODENOSCOPY  2017    mucosa suggestive of Brasher's.     ESOPHAGOGASTRODUODENOSCOPY  2021    Dr Davie Torrez    EYE SURGERY  2023    FISTULA REPAIR      LIVER BIOPSY  2017    UPPER ENDOSCOPIC ULTRASOUND W/ FNA  2017    normal    UPPER GASTROINTESTINAL ENDOSCOPY         Review of patient's allergies indicates:  No Known Allergies    Outpatient Medications Marked as Taking for the 25 encounter (Office Visit) with Keyshawn Florez, DO   Medication Sig Dispense Refill    aspirin (ECOTRIN) 81 MG EC tablet Take 81 mg by mouth once daily.      busPIRone (BUSPAR) 5 MG Tab Take 1 tablet (5 mg total) by mouth 3 (three) times daily. 90 tablet 5    cholecalciferol, vitamin D3, (VITAMIN D3) 50 mcg (2,000 unit) Tab Take 2,000 Units by mouth every other day.      citalopram (CELEXA) 20 MG tablet TAKE TWO TABLETS BY MOUTH ONCE DAILY 90 tablet 3    docusate sodium (COLACE) 100 MG capsule Take 100 mg by mouth 2 (two) times daily as needed for Constipation.      evolocumab (REPATHA SURECLICK) 140 mg/mL Isra Inject 1 mL into the  "skin every 14 (fourteen) days.      fenofibrate (TRICOR) 145 MG tablet Take 145 mg by mouth once daily. (Patient taking differently: Take 145 mg by mouth every other day.)      lisinopriL (PRINIVIL,ZESTRIL) 5 MG tablet TAKE ONE TABLET BY MOUTH TWICE DAILY 60 tablet 11    OCALIVA 5 mg Tab Take 1 tablet by mouth once daily.      pantoprazole (PROTONIX) 40 MG tablet Take 40 mg by mouth once daily.      ursodioL (ACTIGALL) 300 mg capsule Take 300 mg by mouth 2 (two) times daily.         Social History[1]     Family History   Problem Relation Name Age of Onset    Breast cancer Mother Rohan Henson     Hypertension Mother Rohan Henson     Heart disease Mother Rohan Henson     Heart disease Father Guzman Henson     Hypertension Father Guzman Henson     Arthritis Father Guzman Henson     Colon cancer Maternal Aunt          Patient Care Team:  Keyshawn Florez DO as PCP - General (Family Medicine)  Davie Torrez MD as Consulting Physician (Gastroenterology)  Naveed Gomez FNP as Nurse Practitioner (Cardiology)     Subjective:     ROS    See HPI for details  All Other ROS: Negative except as stated in HPI.       Objective:     /79 (BP Location: Left arm, Patient Position: Sitting)   Pulse 73   Resp 18   Ht 5' 6" (1.676 m)   Wt 73 kg (161 lb)   SpO2 97%   BMI 25.99 kg/m²     Physical Exam  Vitals reviewed.   Constitutional:       General: She is not in acute distress.     Appearance: Normal appearance.   Cardiovascular:      Rate and Rhythm: Normal rate and regular rhythm.      Heart sounds: No murmur heard.     No friction rub. No gallop.   Pulmonary:      Effort: No respiratory distress.      Breath sounds: No wheezing, rhonchi or rales.   Musculoskeletal:         General: No swelling, tenderness or deformity.      Right lower leg: No edema.      Left lower leg: No edema.   Skin:     General: Skin is warm and dry.      Findings: No lesion or rash.   Neurological:      General: No focal deficit present. "      Mental Status: She is alert.   Psychiatric:         Mood and Affect: Mood normal.         Assessment/Plan:     1. Stage 3b chronic kidney disease  -stable, followed by nephrology. Limit nephrotoxic agents.   2. Post-menopausal  -     DXA Bone Density Axial Skeleton 1 or more sites; Future; Expected date: 05/28/2025    3. Aortic atherosclerosis  -currently on aspirin 81mg daily and fenofibrate 145mg every other day as tolerated    4. Primary biliary cholangitis  -stable, currently being evaluated for lupus and other autoimmune conditions.         Assessment & Plan               This note was generated with the assistance of ambient listening technology. Verbal consent was obtained by the patient and accompanying visitor(s) for the recording of patient appointment to facilitate this note. I attest to having reviewed and edited the generated note for accuracy, though some syntax or spelling errors may persist. Please contact the author of this note for any clarification.     Follow up:     No follow-ups on file. In addition to their scheduled follow up, the patient has also been instructed to follow up on as needed basis.            [1]   Social History  Socioeconomic History    Marital status:     Number of children: 1   Occupational History    Occupation: retired   Tobacco Use    Smoking status: Former    Smokeless tobacco: Never    Tobacco comments:     Quit years ago dont remember dates   Substance and Sexual Activity    Alcohol use: No    Drug use: Never    Sexual activity: Not Currently     Social Drivers of Health     Financial Resource Strain: Low Risk  (5/28/2025)    Overall Financial Resource Strain (CARDIA)     Difficulty of Paying Living Expenses: Not hard at all   Food Insecurity: No Food Insecurity (5/28/2025)    Hunger Vital Sign     Worried About Running Out of Food in the Last Year: Never true     Ran Out of Food in the Last Year: Never true   Transportation Needs: No Transportation Needs  (5/28/2025)    PRAPARE - Transportation     Lack of Transportation (Medical): No     Lack of Transportation (Non-Medical): No   Physical Activity: Sufficiently Active (5/28/2025)    Exercise Vital Sign     Days of Exercise per Week: 5 days     Minutes of Exercise per Session: 50 min   Stress: No Stress Concern Present (5/28/2025)    [employer] - Occupational Stress Questionnaire     Feeling of Stress : Not at all   Housing Stability: Low Risk  (5/28/2025)    Housing Stability Vital Sign     Unable to Pay for Housing in the Last Year: No     Number of Times Moved in the Last Year: 0     Homeless in the Last Year: No

## 2025-07-02 ENCOUNTER — LAB VISIT (OUTPATIENT)
Dept: LAB | Facility: HOSPITAL | Age: 70
End: 2025-07-02
Attending: INTERNAL MEDICINE
Payer: MEDICARE

## 2025-07-02 DIAGNOSIS — N18.30 CHRONIC KIDNEY DISEASE, STAGE III (MODERATE): Primary | ICD-10-CM

## 2025-07-02 LAB
ALBUMIN SERPL-MCNC: 3.8 G/DL (ref 3.4–4.8)
ALBUMIN/GLOB SERPL: 1.2 RATIO (ref 1.1–2)
ALP SERPL-CCNC: 61 UNIT/L (ref 40–150)
ALT SERPL-CCNC: 26 UNIT/L (ref 0–55)
ANION GAP SERPL CALC-SCNC: 6 MEQ/L
AST SERPL-CCNC: 21 UNIT/L (ref 11–45)
BASOPHILS # BLD AUTO: 0.03 X10(3)/MCL
BASOPHILS NFR BLD AUTO: 0.6 %
BILIRUB SERPL-MCNC: 0.4 MG/DL
BILIRUB UR QL STRIP.AUTO: NEGATIVE
BUN SERPL-MCNC: 29 MG/DL (ref 9.8–20.1)
CALCIUM SERPL-MCNC: 9.5 MG/DL (ref 8.4–10.2)
CHLORIDE SERPL-SCNC: 111 MMOL/L (ref 98–107)
CLARITY UR: CLEAR
CO2 SERPL-SCNC: 25 MMOL/L (ref 23–31)
COLOR UR AUTO: YELLOW
CREAT SERPL-MCNC: 1.02 MG/DL (ref 0.55–1.02)
CREAT UR-MCNC: 83.6 MG/DL (ref 45–106)
CREAT/UREA NIT SERPL: 28
EOSINOPHIL # BLD AUTO: 0.12 X10(3)/MCL (ref 0–0.9)
EOSINOPHIL NFR BLD AUTO: 2.6 %
ERYTHROCYTE [DISTWIDTH] IN BLOOD BY AUTOMATED COUNT: 13.8 % (ref 11.5–17)
GFR SERPLBLD CREATININE-BSD FMLA CKD-EPI: 60 ML/MIN/1.73/M2
GLOBULIN SER-MCNC: 3.3 GM/DL (ref 2.4–3.5)
GLUCOSE SERPL-MCNC: 96 MG/DL (ref 82–115)
GLUCOSE UR QL STRIP: NEGATIVE
HCT VFR BLD AUTO: 36 % (ref 37–47)
HGB BLD-MCNC: 11.6 G/DL (ref 12–16)
HGB UR QL STRIP: NEGATIVE
IMM GRANULOCYTES # BLD AUTO: 0.01 X10(3)/MCL (ref 0–0.04)
IMM GRANULOCYTES NFR BLD AUTO: 0.2 %
KETONES UR QL STRIP: NEGATIVE
LEUKOCYTE ESTERASE UR QL STRIP: NEGATIVE
LYMPHOCYTES # BLD AUTO: 1.4 X10(3)/MCL (ref 0.6–4.6)
LYMPHOCYTES NFR BLD AUTO: 29.9 %
MCH RBC QN AUTO: 28.4 PG (ref 27–31)
MCHC RBC AUTO-ENTMCNC: 32.2 G/DL (ref 33–36)
MCV RBC AUTO: 88.2 FL (ref 80–94)
MONOCYTES # BLD AUTO: 0.43 X10(3)/MCL (ref 0.1–1.3)
MONOCYTES NFR BLD AUTO: 9.2 %
NEUTROPHILS # BLD AUTO: 2.69 X10(3)/MCL (ref 2.1–9.2)
NEUTROPHILS NFR BLD AUTO: 57.5 %
NITRITE UR QL STRIP: NEGATIVE
NRBC BLD AUTO-RTO: 0 %
PH UR STRIP: 6 [PH]
PLATELET # BLD AUTO: 238 X10(3)/MCL (ref 130–400)
PMV BLD AUTO: 11.2 FL (ref 7.4–10.4)
POTASSIUM SERPL-SCNC: 5.1 MMOL/L (ref 3.5–5.1)
PROT SERPL-MCNC: 7.1 GM/DL (ref 5.8–7.6)
PROT UR QL STRIP: NEGATIVE
PROT UR STRIP-MCNC: <6.8 MG/DL
PTH-INTACT SERPL-MCNC: 66.4 PG/ML (ref 8.7–77)
RBC # BLD AUTO: 4.08 X10(6)/MCL (ref 4.2–5.4)
SODIUM SERPL-SCNC: 142 MMOL/L (ref 136–145)
SP GR UR STRIP.AUTO: 1.02 (ref 1–1.03)
UROBILINOGEN UR STRIP-ACNC: 0.2
WBC # BLD AUTO: 4.68 X10(3)/MCL (ref 4.5–11.5)

## 2025-07-02 PROCEDURE — 81003 URINALYSIS AUTO W/O SCOPE: CPT

## 2025-07-02 PROCEDURE — 84156 ASSAY OF PROTEIN URINE: CPT

## 2025-07-02 PROCEDURE — 83970 ASSAY OF PARATHORMONE: CPT

## 2025-07-02 PROCEDURE — 80053 COMPREHEN METABOLIC PANEL: CPT

## 2025-07-02 PROCEDURE — 85025 COMPLETE CBC W/AUTO DIFF WBC: CPT

## 2025-07-02 PROCEDURE — 36415 COLL VENOUS BLD VENIPUNCTURE: CPT
